# Patient Record
Sex: FEMALE | Race: WHITE | NOT HISPANIC OR LATINO | Employment: UNEMPLOYED | URBAN - METROPOLITAN AREA
[De-identification: names, ages, dates, MRNs, and addresses within clinical notes are randomized per-mention and may not be internally consistent; named-entity substitution may affect disease eponyms.]

---

## 2018-02-07 PROCEDURE — 99284 EMERGENCY DEPT VISIT MOD MDM: CPT | Mod: 25

## 2018-02-08 ENCOUNTER — HOSPITAL ENCOUNTER (EMERGENCY)
Facility: HOSPITAL | Age: 53
Discharge: HOME | End: 2018-02-08
Attending: EMERGENCY MEDICINE
Payer: MEDICAID

## 2018-02-08 VITALS
TEMPERATURE: 98 F | BODY MASS INDEX: 24.16 KG/M2 | RESPIRATION RATE: 18 BRPM | HEART RATE: 89 BPM | SYSTOLIC BLOOD PRESSURE: 117 MMHG | OXYGEN SATURATION: 97 % | HEIGHT: 57 IN | WEIGHT: 112 LBS | DIASTOLIC BLOOD PRESSURE: 66 MMHG

## 2018-02-08 DIAGNOSIS — F19.10 DRUG ABUSE: Primary | ICD-10-CM

## 2018-02-08 PROCEDURE — 96360 HYDRATION IV INFUSION INIT: CPT

## 2018-02-08 PROCEDURE — 25000003 PHARM REV CODE 250: Performed by: STUDENT IN AN ORGANIZED HEALTH CARE EDUCATION/TRAINING PROGRAM

## 2018-02-08 RX ORDER — FLUOXETINE HYDROCHLORIDE 20 MG/1
20 CAPSULE ORAL DAILY
Status: ON HOLD | COMMUNITY
End: 2023-01-04 | Stop reason: HOSPADM

## 2018-02-08 RX ADMIN — SODIUM CHLORIDE 1000 ML: 0.9 INJECTION, SOLUTION INTRAVENOUS at 02:02

## 2018-02-08 NOTE — ED NOTES
Pt identifiers Cristal Patricia and correct    LOC: The patient is awake, alert, aware of environment with an appropriate affect. Oriented x3, speaking appropriately  APPEARANCE: Pt resting comfortably, in no acute distress, pt is clean and well groomed, clothing properly fastened  SKIN: Skin warm, dry and intact, normal skin turgor, moist mucus membranes  RESPIRATORY: Airway is open and patent, respirations are spontaneous, even and unlabored, normal effort and rate  CARDIAC: Normal rate and rhythm, no peripheral edema noted, capillary refill < 3 seconds, bilateral radial pulses 2+  ABDOMEN: Soft, nontender, nondistended. Bowel sounds present   NEUROLOGIC: PERRL, facial expression is symmetrical, patient moving all extremities spontaneously, normal sensation in all extremities when touched with a finger.  Follows all commands appropriately. +sleepiness  MUSCULOSKELETAL: No obvious deformities.

## 2018-02-08 NOTE — ED NOTES
"Pt states she smoked a blunt around 2230 last night and thinks she had a seizure then threw up "I was tripping bad". She thinks it might have had MOJO in it. She states that the symptoms started subsided when she arrived to ER. She denies any symptoms at current and feels like she does not need to be here anymore.   "

## 2018-02-08 NOTE — ED PROVIDER NOTES
"Encounter Date: 2/7/2018       History     Chief Complaint   Patient presents with    Allergic Reaction     pt states she smoked "mojo" and became hyper allergic response x 2 hrs ago. pt ao x 2 presently. pt denies chest pain sob or nausea.      Ms. Garcia is a 53 yo F w/ hx of depression on prozac (has not been taking it for 2 weeks but denies depression currently), presenting to ED by EMS after experiencing bad trip, smoking "blunt that might have been mojo."  Patient states that around 10pm she hit a blunt that she is unsure if it was mojo, or laced, or what it's contents were. States that after that she started to have "a bad trip." Friend called EMS. Patient states that she was coming down by the time she arrived here. Denies chest pain, shortness of breath, dizziness, abd pain, nausea, endorses vomiting once. Denies fever, chills, cough. Denies head trauma, neck pain, head pain, focal neuro symptoms. Denies rash, oral or airway swelling, itching, trouble breathing or swallowing.           Review of patient's allergies indicates:  Allergies not on file  No past medical history on file.  No past surgical history on file.  No family history on file.  Social History   Substance Use Topics    Smoking status: Not on file    Smokeless tobacco: Not on file    Alcohol use Not on file     Review of Systems   Constitutional: Negative for appetite change, chills, fatigue and fever.   HENT: Negative for congestion and sore throat.    Eyes: Negative for photophobia and visual disturbance.   Respiratory: Negative for cough, choking, chest tightness, shortness of breath and stridor.    Cardiovascular: Negative for chest pain, palpitations and leg swelling.   Gastrointestinal: Positive for vomiting. Negative for abdominal distention, abdominal pain, diarrhea and nausea.   Genitourinary: Negative for flank pain and hematuria.   Musculoskeletal: Negative for back pain, neck pain and neck stiffness.   Skin: Negative for " pallor, rash and wound.   Neurological: Negative for dizziness, tremors, speech difficulty, weakness, light-headedness and numbness.   Psychiatric/Behavioral: Positive for confusion and hallucinations.       Physical Exam     Initial Vitals [02/07/18 2309]   BP Pulse Resp Temp SpO2   (!) 130/90 (!) 120 -- 98.2 °F (36.8 °C) 99 %      MAP       103.33         Physical Exam    Nursing note and vitals reviewed.  Constitutional: She appears well-developed and well-nourished. She is not diaphoretic. She is cooperative.  Non-toxic appearance. She does not have a sickly appearance. She does not appear ill. No distress.   HENT:   Head: Normocephalic and atraumatic.   Mouth/Throat: Oropharynx is clear and moist. No oropharyngeal exudate.   Eyes: EOM are normal. Pupils are equal, round, and reactive to light. No scleral icterus.   Neck: Normal range of motion.   Cardiovascular: Regular rhythm, normal heart sounds and intact distal pulses. Exam reveals no gallop and no friction rub.    No murmur heard.  Pulmonary/Chest: Breath sounds normal. No accessory muscle usage or stridor. No respiratory distress. She has no decreased breath sounds. She has no wheezes. She has no rhonchi. She has no rales.   Abdominal: Soft. She exhibits no distension. There is no tenderness. There is no rigidity, no rebound, no guarding, no CVA tenderness and negative Shankar's sign.   Musculoskeletal: Normal range of motion. She exhibits no edema or tenderness.   Neurological: She is alert and oriented to person, place, and time. She has normal strength. She displays no atrophy and no tremor. No cranial nerve deficit or sensory deficit. She exhibits normal muscle tone. She displays no seizure activity. Coordination normal. GCS eye subscore is 4. GCS verbal subscore is 5. GCS motor subscore is 6.   Skin: Skin is warm and dry. No erythema. No pallor.   Psychiatric: Her affect is not labile and not inappropriate. Her speech is not rapid and/or pressured and  "not slurred. She is not agitated and not actively hallucinating. Thought content is not delusional. She does not exhibit a depressed mood. She is attentive.         ED Course   Procedures  Labs Reviewed - No data to display       HO2  Ms. Garcia is a 51 yo F bib ems after "bad trip" and smoking possibly laced marijuana or mojo per patient, states that the episode was clearing before she arrived at the hospital, currently denies shortness of breath, chest pain, palpitations, head pain, head trauma, nausea, abd pain, back pain, confusion. Patient is awake alert and oriented, speaking and behaving appropriately, without delusions or hallucinations currently, patient denies HI/SI, states that she is feeling well.  Physical exam unremarkable.   Likely drug induced hallucinations, now observed for >3 hours with improvement in symptoms, no repeat symptoms, patient is alert and oriented, without coingestion. Not currently endorsing symptoms of allergic reaction. At this time believe that patient is stable for discharge.   Tunde Limon MD PGY2  02/08/2018 2:43 AM                         ED Course      Clinical Impression:   The encounter diagnosis was Drug abuse.                           Tunde Limon MD  Resident  02/08/18 0335    "

## 2022-08-01 ENCOUNTER — OFFICE VISIT (OUTPATIENT)
Dept: OPTOMETRY | Facility: CLINIC | Age: 57
End: 2022-08-01
Payer: MEDICAID

## 2022-08-01 DIAGNOSIS — H16.149 SUPERFICIAL PUNCTATE KERATITIS, UNSPECIFIED LATERALITY: Primary | ICD-10-CM

## 2022-08-01 PROCEDURE — 99999 PR PBB SHADOW E&M-NEW PATIENT-LVL II: CPT | Mod: PBBFAC,,, | Performed by: OPTOMETRIST

## 2022-08-01 PROCEDURE — 1159F MED LIST DOCD IN RCRD: CPT | Mod: CPTII,,, | Performed by: OPTOMETRIST

## 2022-08-01 PROCEDURE — 1160F PR REVIEW ALL MEDS BY PRESCRIBER/CLIN PHARMACIST DOCUMENTED: ICD-10-PCS | Mod: CPTII,,, | Performed by: OPTOMETRIST

## 2022-08-01 PROCEDURE — 99202 OFFICE O/P NEW SF 15 MIN: CPT | Mod: PBBFAC,PO | Performed by: OPTOMETRIST

## 2022-08-01 PROCEDURE — 1159F PR MEDICATION LIST DOCUMENTED IN MEDICAL RECORD: ICD-10-PCS | Mod: CPTII,,, | Performed by: OPTOMETRIST

## 2022-08-01 PROCEDURE — 92002 PR EYE EXAM, NEW PATIENT,INTERMED: ICD-10-PCS | Mod: S$PBB,,, | Performed by: OPTOMETRIST

## 2022-08-01 PROCEDURE — 92002 INTRM OPH EXAM NEW PATIENT: CPT | Mod: S$PBB,,, | Performed by: OPTOMETRIST

## 2022-08-01 PROCEDURE — 1160F RVW MEDS BY RX/DR IN RCRD: CPT | Mod: CPTII,,, | Performed by: OPTOMETRIST

## 2022-08-01 PROCEDURE — 99999 PR PBB SHADOW E&M-NEW PATIENT-LVL II: ICD-10-PCS | Mod: PBBFAC,,, | Performed by: OPTOMETRIST

## 2022-08-01 RX ORDER — DEXTROAMPHETAMINE SACCHARATE, AMPHETAMINE ASPARTATE MONOHYDRATE, DEXTROAMPHETAMINE SULFATE AND AMPHETAMINE SULFATE 7.5; 7.5; 7.5; 7.5 MG/1; MG/1; MG/1; MG/1
30 CAPSULE, EXTENDED RELEASE ORAL EVERY MORNING
Status: ON HOLD | COMMUNITY
End: 2023-01-04 | Stop reason: HOSPADM

## 2022-08-01 NOTE — PROGRESS NOTES
Subjective:       Patient ID: Cristal Garcia is a 56 y.o. female      Chief Complaint   Patient presents with    Eye Problem     History of Present Illness  Dls: ?     57 y/o female presents today with c/o x 1 week was working in the yard and had lots of bugs all over her body and had to scratch bugs off eyes. Pt c/o fbs ou pain ou 1-2 had white mucous ou x 1 week ago no tearing or mucous now no changes in vision.  Pt was using otc dry eye gtts ou not using any gtts now. Last used drops x 4 days ago.         Assessment/Plan:     1. Superficial punctate keratitis, unspecified laterality  No FB on exam. Mild SPK. Start AT QID OU. Per pt was given steroid cream to use for bug bites on body - caution with usage around periorbital area.     Follow up if symptoms worsen or fail to improve.

## 2022-12-23 ENCOUNTER — HOSPITAL ENCOUNTER (EMERGENCY)
Facility: HOSPITAL | Age: 57
Discharge: PSYCHIATRIC OR SUBSTANCE USE FACILITY | End: 2022-12-24
Attending: EMERGENCY MEDICINE
Payer: MEDICAID

## 2022-12-23 DIAGNOSIS — Z00.8 MEDICAL CLEARANCE FOR PSYCHIATRIC ADMISSION: ICD-10-CM

## 2022-12-23 DIAGNOSIS — F23 ACUTE PSYCHOSIS: Primary | ICD-10-CM

## 2022-12-23 DIAGNOSIS — N63.20 MASS OF LEFT BREAST, UNSPECIFIED QUADRANT: ICD-10-CM

## 2022-12-23 DIAGNOSIS — F10.929 ALCOHOLIC INTOXICATION WITH COMPLICATION: ICD-10-CM

## 2022-12-23 LAB
ALBUMIN SERPL BCP-MCNC: 4.7 G/DL (ref 3.5–5.2)
ALP SERPL-CCNC: 111 U/L (ref 55–135)
ALT SERPL W/O P-5'-P-CCNC: 44 U/L (ref 10–44)
AMPHET+METHAMPHET UR QL: NEGATIVE
ANION GAP SERPL CALC-SCNC: 12 MMOL/L (ref 8–16)
APAP SERPL-MCNC: <3 UG/ML (ref 10–20)
AST SERPL-CCNC: 41 U/L (ref 10–40)
BARBITURATES UR QL SCN>200 NG/ML: NEGATIVE
BASOPHILS # BLD AUTO: 0.06 K/UL (ref 0–0.2)
BASOPHILS NFR BLD: 0.6 % (ref 0–1.9)
BENZODIAZ UR QL SCN>200 NG/ML: NEGATIVE
BILIRUB SERPL-MCNC: 0.2 MG/DL (ref 0.1–1)
BILIRUB UR QL STRIP: NEGATIVE
BUN SERPL-MCNC: 16 MG/DL (ref 6–20)
BZE UR QL SCN: NEGATIVE
CALCIUM SERPL-MCNC: 9.9 MG/DL (ref 8.7–10.5)
CANNABINOIDS UR QL SCN: NEGATIVE
CHLORIDE SERPL-SCNC: 107 MMOL/L (ref 95–110)
CLARITY UR REFRACT.AUTO: CLEAR
CO2 SERPL-SCNC: 23 MMOL/L (ref 23–29)
COLOR UR AUTO: YELLOW
CREAT SERPL-MCNC: 0.7 MG/DL (ref 0.5–1.4)
CREAT UR-MCNC: 82 MG/DL (ref 15–325)
DIFFERENTIAL METHOD: ABNORMAL
EOSINOPHIL # BLD AUTO: 0.1 K/UL (ref 0–0.5)
EOSINOPHIL NFR BLD: 0.7 % (ref 0–8)
ERYTHROCYTE [DISTWIDTH] IN BLOOD BY AUTOMATED COUNT: 14.6 % (ref 11.5–14.5)
EST. GFR  (NO RACE VARIABLE): >60 ML/MIN/1.73 M^2
ETHANOL SERPL-MCNC: 269 MG/DL
GLUCOSE SERPL-MCNC: 96 MG/DL (ref 70–110)
GLUCOSE UR QL STRIP: NEGATIVE
HCT VFR BLD AUTO: 42.7 % (ref 37–48.5)
HGB BLD-MCNC: 14.7 G/DL (ref 12–16)
HGB UR QL STRIP: NEGATIVE
IMM GRANULOCYTES # BLD AUTO: 0.07 K/UL (ref 0–0.04)
IMM GRANULOCYTES NFR BLD AUTO: 0.7 % (ref 0–0.5)
KETONES UR QL STRIP: NEGATIVE
LEUKOCYTE ESTERASE UR QL STRIP: NEGATIVE
LYMPHOCYTES # BLD AUTO: 3.7 K/UL (ref 1–4.8)
LYMPHOCYTES NFR BLD: 38.2 % (ref 18–48)
MCH RBC QN AUTO: 34.1 PG (ref 27–31)
MCHC RBC AUTO-ENTMCNC: 34.4 G/DL (ref 32–36)
MCV RBC AUTO: 99 FL (ref 82–98)
METHADONE UR QL SCN>300 NG/ML: NEGATIVE
MONOCYTES # BLD AUTO: 0.6 K/UL (ref 0.3–1)
MONOCYTES NFR BLD: 5.7 % (ref 4–15)
NEUTROPHILS # BLD AUTO: 5.2 K/UL (ref 1.8–7.7)
NEUTROPHILS NFR BLD: 54.1 % (ref 38–73)
NITRITE UR QL STRIP: NEGATIVE
NRBC BLD-RTO: 0 /100 WBC
OPIATES UR QL SCN: NEGATIVE
PCP UR QL SCN>25 NG/ML: NEGATIVE
PH UR STRIP: 5 [PH] (ref 5–8)
PLATELET # BLD AUTO: 486 K/UL (ref 150–450)
PMV BLD AUTO: 9 FL (ref 9.2–12.9)
POTASSIUM SERPL-SCNC: 5 MMOL/L (ref 3.5–5.1)
PROT SERPL-MCNC: 8.8 G/DL (ref 6–8.4)
PROT UR QL STRIP: NEGATIVE
RBC # BLD AUTO: 4.31 M/UL (ref 4–5.4)
SALICYLATES SERPL-MCNC: <5 MG/DL (ref 15–30)
SARS-COV-2 RDRP RESP QL NAA+PROBE: NEGATIVE
SODIUM SERPL-SCNC: 142 MMOL/L (ref 136–145)
SP GR UR STRIP: 1.02 (ref 1–1.03)
TOXICOLOGY INFORMATION: NORMAL
TSH SERPL DL<=0.005 MIU/L-ACNC: 0.45 UIU/ML (ref 0.4–4)
URN SPEC COLLECT METH UR: NORMAL
WBC # BLD AUTO: 9.64 K/UL (ref 3.9–12.7)

## 2022-12-23 PROCEDURE — 99285 EMERGENCY DEPT VISIT HI MDM: CPT | Mod: 25

## 2022-12-23 PROCEDURE — 96361 HYDRATE IV INFUSION ADD-ON: CPT

## 2022-12-23 PROCEDURE — 93010 ELECTROCARDIOGRAM REPORT: CPT | Mod: ,,, | Performed by: INTERNAL MEDICINE

## 2022-12-23 PROCEDURE — 81003 URINALYSIS AUTO W/O SCOPE: CPT | Performed by: EMERGENCY MEDICINE

## 2022-12-23 PROCEDURE — 99285 PR EMERGENCY DEPT VISIT,LEVEL V: ICD-10-PCS | Mod: CS,,, | Performed by: EMERGENCY MEDICINE

## 2022-12-23 PROCEDURE — 84443 ASSAY THYROID STIM HORMONE: CPT | Performed by: EMERGENCY MEDICINE

## 2022-12-23 PROCEDURE — 25000003 PHARM REV CODE 250: Performed by: EMERGENCY MEDICINE

## 2022-12-23 PROCEDURE — 63600175 PHARM REV CODE 636 W HCPCS: Performed by: EMERGENCY MEDICINE

## 2022-12-23 PROCEDURE — 80179 DRUG ASSAY SALICYLATE: CPT | Performed by: EMERGENCY MEDICINE

## 2022-12-23 PROCEDURE — U0002 COVID-19 LAB TEST NON-CDC: HCPCS | Performed by: EMERGENCY MEDICINE

## 2022-12-23 PROCEDURE — 85025 COMPLETE CBC W/AUTO DIFF WBC: CPT | Performed by: EMERGENCY MEDICINE

## 2022-12-23 PROCEDURE — 80053 COMPREHEN METABOLIC PANEL: CPT | Performed by: EMERGENCY MEDICINE

## 2022-12-23 PROCEDURE — 82077 ASSAY SPEC XCP UR&BREATH IA: CPT | Mod: 91 | Performed by: EMERGENCY MEDICINE

## 2022-12-23 PROCEDURE — 80143 DRUG ASSAY ACETAMINOPHEN: CPT | Performed by: EMERGENCY MEDICINE

## 2022-12-23 PROCEDURE — 99285 EMERGENCY DEPT VISIT HI MDM: CPT | Mod: CS,,, | Performed by: EMERGENCY MEDICINE

## 2022-12-23 PROCEDURE — 96372 THER/PROPH/DIAG INJ SC/IM: CPT | Performed by: EMERGENCY MEDICINE

## 2022-12-23 PROCEDURE — 93010 EKG 12-LEAD: ICD-10-PCS | Mod: ,,, | Performed by: INTERNAL MEDICINE

## 2022-12-23 PROCEDURE — 93005 ELECTROCARDIOGRAM TRACING: CPT

## 2022-12-23 PROCEDURE — 80307 DRUG TEST PRSMV CHEM ANLYZR: CPT | Performed by: EMERGENCY MEDICINE

## 2022-12-23 PROCEDURE — 96374 THER/PROPH/DIAG INJ IV PUSH: CPT

## 2022-12-23 RX ORDER — HALOPERIDOL 5 MG/ML
5 INJECTION INTRAMUSCULAR
Status: COMPLETED | OUTPATIENT
Start: 2022-12-23 | End: 2022-12-23

## 2022-12-23 RX ORDER — DIPHENHYDRAMINE HYDROCHLORIDE 50 MG/ML
50 INJECTION INTRAMUSCULAR; INTRAVENOUS
Status: ACTIVE | OUTPATIENT
Start: 2022-12-23 | End: 2022-12-24

## 2022-12-23 RX ORDER — LORAZEPAM 2 MG/ML
1 INJECTION INTRAMUSCULAR
Status: ACTIVE | OUTPATIENT
Start: 2022-12-23 | End: 2022-12-24

## 2022-12-23 RX ORDER — LORAZEPAM 2 MG/ML
1 INJECTION INTRAMUSCULAR
Status: COMPLETED | OUTPATIENT
Start: 2022-12-23 | End: 2022-12-23

## 2022-12-23 RX ORDER — THIAMINE HCL 100 MG
100 TABLET ORAL DAILY
Status: DISCONTINUED | OUTPATIENT
Start: 2022-12-23 | End: 2022-12-24 | Stop reason: HOSPADM

## 2022-12-23 RX ORDER — LORAZEPAM 2 MG/ML
1 INJECTION INTRAMUSCULAR
Status: DISCONTINUED | OUTPATIENT
Start: 2022-12-23 | End: 2022-12-23

## 2022-12-23 RX ORDER — DIPHENHYDRAMINE HYDROCHLORIDE 50 MG/ML
50 INJECTION INTRAMUSCULAR; INTRAVENOUS
Status: DISCONTINUED | OUTPATIENT
Start: 2022-12-23 | End: 2022-12-23

## 2022-12-23 RX ORDER — B-COMPLEX WITH VITAMIN C
1 TABLET ORAL DAILY
Status: DISCONTINUED | OUTPATIENT
Start: 2022-12-23 | End: 2022-12-24 | Stop reason: HOSPADM

## 2022-12-23 RX ORDER — FOLIC ACID 1 MG/1
1 TABLET ORAL
Status: COMPLETED | OUTPATIENT
Start: 2022-12-23 | End: 2022-12-23

## 2022-12-23 RX ADMIN — LORAZEPAM 1 MG: 2 INJECTION INTRAMUSCULAR; INTRAVENOUS at 01:12

## 2022-12-23 RX ADMIN — Medication 100 MG: at 06:12

## 2022-12-23 RX ADMIN — HALOPERIDOL LACTATE 5 MG: 5 INJECTION, SOLUTION INTRAMUSCULAR at 03:12

## 2022-12-23 RX ADMIN — FOLIC ACID 1 MG: 1 TABLET ORAL at 06:12

## 2022-12-23 RX ADMIN — SODIUM CHLORIDE, SODIUM LACTATE, POTASSIUM CHLORIDE, AND CALCIUM CHLORIDE 1000 ML: .6; .31; .03; .02 INJECTION, SOLUTION INTRAVENOUS at 12:12
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
## 2022-12-23 NOTE — ED TRIAGE NOTES
Cristal Garcia, a 57 y.o. female presents to the ED w/ complaint of Psych eval    Triage note: Patient brought in in police custody after a report from family of patients irrational behavior. Stated patient has been drinking and is bipolar and off of medications. Patient also states that she has been off her medications since October and has been drinking. Patient is extremely excitable, anxious agitated at this time. Patient is unable to follow simple commands and continues to pace.  Chief Complaint   Patient presents with    Psychiatric Evaluation     Brought in by [facility]. Family reports pt drinking a lot and off meds. Hx of schizophrenia.     Review of patient's allergies indicates:  No Known Allergies  Past Medical History:   Diagnosis Date    Depression

## 2022-12-23 NOTE — ED NOTES
Unable to start IV, or additional meds due to patients aggitation. Provider Notified order for IM Ativan obtained.

## 2022-12-24 VITALS
SYSTOLIC BLOOD PRESSURE: 110 MMHG | DIASTOLIC BLOOD PRESSURE: 75 MMHG | HEIGHT: 57 IN | BODY MASS INDEX: 24.81 KG/M2 | RESPIRATION RATE: 16 BRPM | OXYGEN SATURATION: 95 % | TEMPERATURE: 98 F | HEART RATE: 87 BPM | WEIGHT: 115 LBS

## 2022-12-24 PROBLEM — R03.0 ELEVATED BP WITHOUT DIAGNOSIS OF HYPERTENSION: Status: ACTIVE | Noted: 2022-12-24

## 2022-12-24 PROBLEM — F10.10 ALCOHOL ABUSE: Status: ACTIVE | Noted: 2022-12-24

## 2022-12-24 LAB — ETHANOL SERPL-MCNC: 26 MG/DL

## 2022-12-24 RX ORDER — OLANZAPINE 5 MG/1
10 TABLET, ORALLY DISINTEGRATING ORAL EVERY 8 HOURS PRN
Status: DISCONTINUED | OUTPATIENT
Start: 2022-12-24 | End: 2022-12-24 | Stop reason: HOSPADM

## 2022-12-24 NOTE — ED NOTES
Patient's father, Mr. Alfa Garcia was contacted @ [phone] to inform of patient's pending transfer to [facility].

## 2022-12-24 NOTE — ED NOTES
Viraj at bedside for pt transfer; original PEC, face sheet, personal belongings given to [facility] personal. Pt is calm and cooperative. Security notified and at bedside. Pt calm and cooperative.

## 2023-01-04 ENCOUNTER — PATIENT OUTREACH (OUTPATIENT)
Dept: ADMINISTRATIVE | Facility: OTHER | Age: 58
End: 2023-01-04
Payer: MEDICAID

## 2023-01-09 NOTE — PROGRESS NOTES
CHW - Initial Contact    This Community Health Worker completed the Social Determinant of Health questionnaire with patient via telephone today.    Pt identified barriers of most importance are: finances and outpatient rehab  Referrals to community agencies completed with patient/caregiver consent outside of [facility] include: Behavioral Health Services, Power to Care([facility]), [facility]  Referrals were put through [facility] - No  Other information discussed the patient needs / wants help with: We agreed to follow up in a week after patient has had a chance to call these resources.  Follow-up Outreach - Due: 1/18/2023

## 2023-01-11 ENCOUNTER — PATIENT OUTREACH (OUTPATIENT)
Dept: ADMINISTRATIVE | Facility: OTHER | Age: 58
End: 2023-01-11
Payer: MEDICAID

## 2023-01-11 NOTE — PROGRESS NOTES
CHW - Follow Up and Case Closure    This Community Health Worker completed a follow up visit with caregiver via telephone today.  Caregiver reported: has numbers of resources to call.   Caregiver denied any additional needs at this time and agrees with episode closure at this time.  Provided caregiver with Community Health Worker's contact information and encouraged him to contact this Community Health Worker if additional needs arise.

## 2023-04-10 ENCOUNTER — HOSPITAL ENCOUNTER (EMERGENCY)
Facility: HOSPITAL | Age: 58
Discharge: PSYCHIATRIC OR SUBSTANCE USE FACILITY | End: 2023-04-11
Attending: EMERGENCY MEDICINE
Payer: MEDICAID

## 2023-04-10 DIAGNOSIS — Z00.8 MEDICAL CLEARANCE FOR PSYCHIATRIC ADMISSION: ICD-10-CM

## 2023-04-10 DIAGNOSIS — R00.0 TACHYCARDIA: ICD-10-CM

## 2023-04-10 DIAGNOSIS — F23 ACUTE PSYCHOSIS: Primary | ICD-10-CM

## 2023-04-10 LAB
ALBUMIN SERPL BCP-MCNC: 4.4 G/DL (ref 3.5–5.2)
ALP SERPL-CCNC: 99 U/L (ref 55–135)
ALT SERPL W/O P-5'-P-CCNC: 59 U/L (ref 10–44)
ANION GAP SERPL CALC-SCNC: 15 MMOL/L (ref 8–16)
APAP SERPL-MCNC: <3 UG/ML (ref 10–20)
AST SERPL-CCNC: 54 U/L (ref 10–40)
BASOPHILS # BLD AUTO: 0.04 K/UL (ref 0–0.2)
BASOPHILS NFR BLD: 0.4 % (ref 0–1.9)
BILIRUB SERPL-MCNC: 0.3 MG/DL (ref 0.1–1)
BUN SERPL-MCNC: 19 MG/DL (ref 6–20)
CALCIUM SERPL-MCNC: 9.5 MG/DL (ref 8.7–10.5)
CHLORIDE SERPL-SCNC: 107 MMOL/L (ref 95–110)
CO2 SERPL-SCNC: 17 MMOL/L (ref 23–29)
CREAT SERPL-MCNC: 0.8 MG/DL (ref 0.5–1.4)
DIFFERENTIAL METHOD: ABNORMAL
EOSINOPHIL # BLD AUTO: 0.1 K/UL (ref 0–0.5)
EOSINOPHIL NFR BLD: 0.5 % (ref 0–8)
ERYTHROCYTE [DISTWIDTH] IN BLOOD BY AUTOMATED COUNT: 13.5 % (ref 11.5–14.5)
EST. GFR  (NO RACE VARIABLE): >60 ML/MIN/1.73 M^2
ETHANOL SERPL-MCNC: 260 MG/DL
GLUCOSE SERPL-MCNC: 120 MG/DL (ref 70–110)
HCT VFR BLD AUTO: 41.5 % (ref 37–48.5)
HCV AB SERPL QL IA: NORMAL
HGB BLD-MCNC: 13.8 G/DL (ref 12–16)
HIV 1+2 AB+HIV1 P24 AG SERPL QL IA: NORMAL
IMM GRANULOCYTES # BLD AUTO: 0.03 K/UL (ref 0–0.04)
IMM GRANULOCYTES NFR BLD AUTO: 0.3 % (ref 0–0.5)
LYMPHOCYTES # BLD AUTO: 3.4 K/UL (ref 1–4.8)
LYMPHOCYTES NFR BLD: 36.6 % (ref 18–48)
MCH RBC QN AUTO: 31.5 PG (ref 27–31)
MCHC RBC AUTO-ENTMCNC: 33.3 G/DL (ref 32–36)
MCV RBC AUTO: 95 FL (ref 82–98)
MONOCYTES # BLD AUTO: 0.4 K/UL (ref 0.3–1)
MONOCYTES NFR BLD: 4.7 % (ref 4–15)
NEUTROPHILS # BLD AUTO: 5.3 K/UL (ref 1.8–7.7)
NEUTROPHILS NFR BLD: 57.5 % (ref 38–73)
NRBC BLD-RTO: 0 /100 WBC
PLATELET # BLD AUTO: 358 K/UL (ref 150–450)
PMV BLD AUTO: 9.5 FL (ref 9.2–12.9)
POTASSIUM SERPL-SCNC: 4.7 MMOL/L (ref 3.5–5.1)
PROT SERPL-MCNC: 8.7 G/DL (ref 6–8.4)
RBC # BLD AUTO: 4.38 M/UL (ref 4–5.4)
SALICYLATES SERPL-MCNC: <5 MG/DL (ref 15–30)
SODIUM SERPL-SCNC: 139 MMOL/L (ref 136–145)
TSH SERPL DL<=0.005 MIU/L-ACNC: 1.91 UIU/ML (ref 0.4–4)
WBC # BLD AUTO: 9.3 K/UL (ref 3.9–12.7)

## 2023-04-10 PROCEDURE — 93005 ELECTROCARDIOGRAM TRACING: CPT

## 2023-04-10 PROCEDURE — 82077 ASSAY SPEC XCP UR&BREATH IA: CPT | Performed by: EMERGENCY MEDICINE

## 2023-04-10 PROCEDURE — 80053 COMPREHEN METABOLIC PANEL: CPT | Performed by: EMERGENCY MEDICINE

## 2023-04-10 PROCEDURE — 93010 EKG 12-LEAD: ICD-10-PCS | Mod: ,,, | Performed by: INTERNAL MEDICINE

## 2023-04-10 PROCEDURE — 25000003 PHARM REV CODE 250: Performed by: EMERGENCY MEDICINE

## 2023-04-10 PROCEDURE — 96372 THER/PROPH/DIAG INJ SC/IM: CPT | Performed by: EMERGENCY MEDICINE

## 2023-04-10 PROCEDURE — 80179 DRUG ASSAY SALICYLATE: CPT | Performed by: EMERGENCY MEDICINE

## 2023-04-10 PROCEDURE — 99285 EMERGENCY DEPT VISIT HI MDM: CPT | Mod: ,,, | Performed by: EMERGENCY MEDICINE

## 2023-04-10 PROCEDURE — 85025 COMPLETE CBC W/AUTO DIFF WBC: CPT | Performed by: EMERGENCY MEDICINE

## 2023-04-10 PROCEDURE — 84443 ASSAY THYROID STIM HORMONE: CPT | Performed by: EMERGENCY MEDICINE

## 2023-04-10 PROCEDURE — 99285 PR EMERGENCY DEPT VISIT,LEVEL V: ICD-10-PCS | Mod: ,,, | Performed by: EMERGENCY MEDICINE

## 2023-04-10 PROCEDURE — 86803 HEPATITIS C AB TEST: CPT | Performed by: PHYSICIAN ASSISTANT

## 2023-04-10 PROCEDURE — 63600175 PHARM REV CODE 636 W HCPCS: Performed by: EMERGENCY MEDICINE

## 2023-04-10 PROCEDURE — 96361 HYDRATE IV INFUSION ADD-ON: CPT

## 2023-04-10 PROCEDURE — 87389 HIV-1 AG W/HIV-1&-2 AB AG IA: CPT | Performed by: PHYSICIAN ASSISTANT

## 2023-04-10 PROCEDURE — 96360 HYDRATION IV INFUSION INIT: CPT

## 2023-04-10 PROCEDURE — 93010 ELECTROCARDIOGRAM REPORT: CPT | Mod: ,,, | Performed by: INTERNAL MEDICINE

## 2023-04-10 PROCEDURE — 80143 DRUG ASSAY ACETAMINOPHEN: CPT | Performed by: EMERGENCY MEDICINE

## 2023-04-10 RX ORDER — HALOPERIDOL 5 MG/ML
2.5 INJECTION INTRAMUSCULAR EVERY 4 HOURS PRN
Status: DISCONTINUED | OUTPATIENT
Start: 2023-04-10 | End: 2023-04-11 | Stop reason: HOSPADM

## 2023-04-10 RX ORDER — DROPERIDOL 2.5 MG/ML
10 INJECTION, SOLUTION INTRAMUSCULAR; INTRAVENOUS ONCE
Status: COMPLETED | OUTPATIENT
Start: 2023-04-10 | End: 2023-04-10

## 2023-04-10 RX ADMIN — DROPERIDOL 10 MG: 2.5 INJECTION, SOLUTION INTRAMUSCULAR; INTRAVENOUS at 07:04

## 2023-04-10 RX ADMIN — SODIUM CHLORIDE 1000 ML: 0.9 INJECTION, SOLUTION INTRAVENOUS at 07:04

## 2023-04-10 RX ADMIN — DEXTROSE AND SODIUM CHLORIDE 1000 ML: 5; .9 INJECTION, SOLUTION INTRAVENOUS at 10:04

## 2023-04-11 VITALS
SYSTOLIC BLOOD PRESSURE: 115 MMHG | HEART RATE: 71 BPM | BODY MASS INDEX: 24.81 KG/M2 | RESPIRATION RATE: 16 BRPM | HEIGHT: 57 IN | TEMPERATURE: 98 F | OXYGEN SATURATION: 98 % | WEIGHT: 115 LBS | DIASTOLIC BLOOD PRESSURE: 65 MMHG

## 2023-04-11 LAB
AMPHET+METHAMPHET UR QL: NEGATIVE
B-HCG UR QL: NEGATIVE
BARBITURATES UR QL SCN>200 NG/ML: NEGATIVE
BENZODIAZ UR QL SCN>200 NG/ML: NEGATIVE
BILIRUB UR QL STRIP: NEGATIVE
BZE UR QL SCN: NEGATIVE
CANNABINOIDS UR QL SCN: ABNORMAL
CLARITY UR REFRACT.AUTO: CLEAR
COLOR UR AUTO: YELLOW
CREAT UR-MCNC: 73 MG/DL (ref 15–325)
CTP QC/QA: YES
ETHANOL SERPL-MCNC: 82 MG/DL
GLUCOSE UR QL STRIP: ABNORMAL
HGB UR QL STRIP: NEGATIVE
HYALINE CASTS UR QL AUTO: 3 /LPF
KETONES UR QL STRIP: NEGATIVE
LEUKOCYTE ESTERASE UR QL STRIP: ABNORMAL
METHADONE UR QL SCN>300 NG/ML: NEGATIVE
MICROSCOPIC COMMENT: ABNORMAL
NITRITE UR QL STRIP: NEGATIVE
OPIATES UR QL SCN: NEGATIVE
PCP UR QL SCN>25 NG/ML: NEGATIVE
PH UR STRIP: 6 [PH] (ref 5–8)
PROT UR QL STRIP: NEGATIVE
RBC #/AREA URNS AUTO: 2 /HPF (ref 0–4)
SP GR UR STRIP: 1.01 (ref 1–1.03)
SQUAMOUS #/AREA URNS AUTO: 1 /HPF
TOXICOLOGY INFORMATION: ABNORMAL
URN SPEC COLLECT METH UR: ABNORMAL
WBC #/AREA URNS AUTO: 2 /HPF (ref 0–5)
YEAST UR QL AUTO: ABNORMAL

## 2023-04-11 PROCEDURE — 99285 EMERGENCY DEPT VISIT HI MDM: CPT

## 2023-04-11 PROCEDURE — 82077 ASSAY SPEC XCP UR&BREATH IA: CPT | Performed by: STUDENT IN AN ORGANIZED HEALTH CARE EDUCATION/TRAINING PROGRAM

## 2023-04-11 PROCEDURE — 81001 URINALYSIS AUTO W/SCOPE: CPT | Mod: 59 | Performed by: EMERGENCY MEDICINE

## 2023-04-11 PROCEDURE — 80307 DRUG TEST PRSMV CHEM ANLYZR: CPT | Performed by: EMERGENCY MEDICINE

## 2023-04-11 PROCEDURE — 81025 URINE PREGNANCY TEST: CPT | Performed by: STUDENT IN AN ORGANIZED HEALTH CARE EDUCATION/TRAINING PROGRAM

## 2023-04-11 PROCEDURE — 96361 HYDRATE IV INFUSION ADD-ON: CPT

## 2023-04-11 NOTE — PROVIDER PROGRESS NOTES - EMERGENCY DEPT.
"Encounter Date: 4/10/2023    ED Physician Progress Notes          ED Resident HAND-OFF NOTE:  10:16 PM 4/11/2023  Cristal Garcia is a 57 y.o. female who presented to the ED on 4/10/2023 and has been managed by Dr. Corral, who reports patient C/O OPC. I assumed care of patient from off-going ED physician team at 10:16 PM pending medical clearance.      On my exam, I appreciate:  /76 (BP Location: Right arm, Patient Position: Lying)   Pulse 80   Temp 98.3 °F (36.8 °C) (Oral)   Resp 20   Ht 4' 9" (1.448 m)   Wt 52.2 kg (115 lb)   SpO2 99%   BMI 24.89 kg/m²         Disposition: I anticipate patient will be transferred to a psychiatric facility.   I have discussed and counseled Cristal Garcia regarding exam, results, diagnosis, treatment, and plan.  ______________________  Geo Benitez MD   Emergency Medicine Resident  10:16 PM 4/10/2023      UPDATE:   CBC unremarkable without leukocytosis, significant anemia, or decreased platelets. Normal TSH.  Negative salicylate and acetaminophen levels.  CMP shows mildly low bicarb at 17, which we suspect to be secondary to alcoholic ketoacidosis.    Update 4:21 AM  Patient's repeat ethanol is 80.  Patient remains hemodynamically and clinically stable.  She will be medically cleared at this time and transferred to psychiatric facility upon placement.      Clinical Impression  :  Tachycardia  Medical clearance for psychiatric admission  Acute psychosis (Primary)    "

## 2023-04-11 NOTE — ED NOTES
Pt escorted off of the unit on a stretcher by ED and security staff. Pt's PEC, transfer form, and all belongings given to [facility] staff. Pt remained calm and cooperative for the transfer.

## 2023-04-11 NOTE — ED NOTES
Pt dressed in paper scrubs per hospital policy. All harmful objects removed from the room. Pt belongings removed, documented and placed in locked closet. Sitter at bedside maintaining direct 1-1 observation.

## 2023-04-11 NOTE — ED PROVIDER NOTES
"Encounter Date: 4/10/2023       History     Chief Complaint   Patient presents with    Mental Health Problem     OPC; decompensated schizo +ETOH     HPI patient is a 57-year-old female with a history of bipolar affective disorder, schizophrenia per family who was brought in by 's office with OPC in place for agitation and grave disability.  Per documentation, the patient was noted to be significantly agitated, aggressive towards family member, father who lives with the patient.  Patient arrives with OPC in place with 's agitated, screaming "tell these mother fuckers to stop laughing at me"!  Patient is unwilling to answer any questions but when asked what brought her to the emergency department she yells "because I do drugs", and does not provide any additional details.     Review of patient's allergies indicates:  No Known Allergies  Past Medical History:   Diagnosis Date    Bipolar disorder, unspecified     Depression      No past surgical history on file.  Family History   Problem Relation Age of Onset    No Known Problems Mother     No Known Problems Father     No Known Problems Sister     No Known Problems Brother     No Known Problems Maternal Aunt     No Known Problems Maternal Uncle     No Known Problems Paternal Aunt     No Known Problems Paternal Uncle     No Known Problems Maternal Grandmother     No Known Problems Maternal Grandfather     No Known Problems Paternal Grandmother     No Known Problems Paternal Grandfather      Social History     Tobacco Use    Smoking status: Some Days     Types: Cigarettes    Smokeless tobacco: Never    Tobacco comments:     Patient stated that she smokes 1 pack a week.    Substance Use Topics    Alcohol use: Yes     Comment: Patient stated that she drinks alot    Drug use: Yes     Types: Marijuana     Review of Systems  10 point review of systems reviewed with patient otherwise negative.     Physical Exam     Initial Vitals [04/10/23 1922]   BP Pulse Resp " Temp SpO2   (!) 144/88 (!) 160 20 98 °F (36.7 °C) 98 %      MAP       --         Physical Exam  Physical Exam     Nursing note and vitals reviewed.  Constitutional: Patient appears well-developed and well-nourished. No distress.   HENT:   Head: Normocephalic and atraumatic.  Mouth: Edentulous    Eyes: Conjunctivae and EOM are normal. Pupils are equal, round, and reactive to light.   Neck: Neck supple.   Normal range of motion.  Cardiovascular: Normal rate, regular rhythm, normal heart sounds and intact distal pulses.   Pulmonary/Chest: Breath sounds normal.   Abdominal: Abdomen is soft. Patient exhibits no distension. There is no abdominal tenderness.   Musculoskeletal:      Cervical back: Normal range of motion and neck supple.   Neurological: Patient is alert and oriented to person, place, and time. No cranial nerve deficit. Gait normal. GCS score is 15.    Skin: Skin is warm and dry.  Psych: Patient screaming, agitated, not redirectable on arrival    ED Course   Procedures  Labs Reviewed   COMPREHENSIVE METABOLIC PANEL - Abnormal; Notable for the following components:       Result Value    CO2 17 (*)     Glucose 120 (*)     Total Protein 8.7 (*)     AST 54 (*)     ALT 59 (*)     All other components within normal limits    Narrative:     Release to patient->Immediate   HIV 1 / 2 ANTIBODY   HEPATITIS C ANTIBODY   TSH   URINALYSIS, REFLEX TO URINE CULTURE   DRUG SCREEN PANEL, URINE EMERGENCY   ALCOHOL,MEDICAL (ETHANOL)   ACETAMINOPHEN LEVEL   SALICYLATE LEVEL   CBC W/ AUTO DIFFERENTIAL          Imaging Results    None          Medications   sodium chloride 0.9% bolus 1,000 mL 1,000 mL (has no administration in time range)   haloperidol lactate injection 2.5 mg (has no administration in time range)   droPERidol injection 10 mg (10 mg Intramuscular Given 4/10/23 1930)                       Patient arrives to the emergency department screaming, agitated, aggressive toward staff and not redirectable and thus received  "10 mg intramuscular droperidol for the safety of the patient and staff.  After receiving droperidol, the patient is much more calm and states that she is in the emergency department because she she does drugs" but denies any other complaints, no pain, short shortness of breath, abdominal pain, injuries.  Patient was placed on a PEC immediately upon arrival given patient with grave disability and decompensated mental illness.    I have personally reviewed and interpreted the patient's EKG:  Sinus tachycardia at 108bpm., underlying motion artifact, QTc 482 without evidence of ischemia  I have personally reviewed and interpreted the patients laboratory studies.  AST 54, ALT 59, glucose 120, CO2 17    At the time of sign-out, patient is pending laboratory studies, repeat chemistry following IV fluids, re-evaluation for medical clearance for transfer to psychiatric facility.          Clinical Impression:   Final diagnoses:  [R00.0] Tachycardia               Nga Chong MD  04/10/23 2148    "

## 2023-04-11 NOTE — ED NOTES
Pt Belongings:     Closet:   1 orange and white romper  1 pair of white socks  1 pair of black shoes  1 pair of underwear

## 2023-04-11 NOTE — ED TRIAGE NOTES
Pt arrives via OPC, combative, verbally aggressive and uncooperative. Pt had to be restrained by security and staff prior to pt being roomed. Pt poor historian at this time. Hx schizophrenia.

## 2023-04-11 NOTE — ED NOTES
Assumed pt care, she is lying on the stretcher resting NAD noted. Pt is calm and cooperative. Pt informed of her pending transfer to [facility]. She is dressed in paper scrubs all belongings secured. Pt denies pain or discomfort. RN at the bedside, DVC maintained.

## 2023-04-12 PROBLEM — R79.89 ELEVATED LFTS: Status: ACTIVE | Noted: 2023-04-12

## 2023-04-12 PROBLEM — R19.7 DIARRHEA: Status: ACTIVE | Noted: 2023-04-12

## 2023-05-01 ENCOUNTER — HOSPITAL ENCOUNTER (EMERGENCY)
Facility: OTHER | Age: 58
Discharge: HOME | End: 2023-05-01
Attending: EMERGENCY MEDICINE
Payer: MEDICAID

## 2023-05-01 VITALS
WEIGHT: 125 LBS | RESPIRATION RATE: 17 BRPM | BODY MASS INDEX: 28.12 KG/M2 | SYSTOLIC BLOOD PRESSURE: 101 MMHG | TEMPERATURE: 98 F | OXYGEN SATURATION: 97 % | DIASTOLIC BLOOD PRESSURE: 62 MMHG | HEIGHT: 56 IN | HEART RATE: 85 BPM

## 2023-05-01 DIAGNOSIS — M79.89 LEG SWELLING: Primary | ICD-10-CM

## 2023-05-01 DIAGNOSIS — N63.20 MASS OF LEFT BREAST, UNSPECIFIED QUADRANT: ICD-10-CM

## 2023-05-01 DIAGNOSIS — R06.02 SHORTNESS OF BREATH: ICD-10-CM

## 2023-05-01 DIAGNOSIS — R91.1 LUNG NODULE SEEN ON IMAGING STUDY: ICD-10-CM

## 2023-05-01 DIAGNOSIS — D64.9 ANEMIA, UNSPECIFIED TYPE: ICD-10-CM

## 2023-05-01 DIAGNOSIS — R53.83 FATIGUE, UNSPECIFIED TYPE: ICD-10-CM

## 2023-05-01 LAB
ALBUMIN SERPL BCP-MCNC: 3.5 G/DL (ref 3.5–5.2)
ALP SERPL-CCNC: 67 U/L (ref 55–135)
ALT SERPL W/O P-5'-P-CCNC: 21 U/L (ref 10–44)
ANION GAP SERPL CALC-SCNC: 5 MMOL/L (ref 8–16)
AST SERPL-CCNC: 20 U/L (ref 10–40)
BASOPHILS # BLD AUTO: 0.04 K/UL (ref 0–0.2)
BASOPHILS NFR BLD: 0.5 % (ref 0–1.9)
BILIRUB SERPL-MCNC: 0.2 MG/DL (ref 0.1–1)
BNP SERPL-MCNC: 47 PG/ML (ref 0–99)
BUN SERPL-MCNC: 11 MG/DL (ref 6–20)
CALCIUM SERPL-MCNC: 9.4 MG/DL (ref 8.7–10.5)
CHLORIDE SERPL-SCNC: 105 MMOL/L (ref 95–110)
CO2 SERPL-SCNC: 28 MMOL/L (ref 23–29)
CREAT SERPL-MCNC: 0.7 MG/DL (ref 0.5–1.4)
DIFFERENTIAL METHOD: ABNORMAL
EOSINOPHIL # BLD AUTO: 0.2 K/UL (ref 0–0.5)
EOSINOPHIL NFR BLD: 2.3 % (ref 0–8)
ERYTHROCYTE [DISTWIDTH] IN BLOOD BY AUTOMATED COUNT: 14.6 % (ref 11.5–14.5)
EST. GFR  (NO RACE VARIABLE): >60 ML/MIN/1.73 M^2
GLUCOSE SERPL-MCNC: 82 MG/DL (ref 70–110)
HCT VFR BLD AUTO: 32.3 % (ref 37–48.5)
HGB BLD-MCNC: 10.8 G/DL (ref 12–16)
IMM GRANULOCYTES # BLD AUTO: 0.02 K/UL (ref 0–0.04)
IMM GRANULOCYTES NFR BLD AUTO: 0.3 % (ref 0–0.5)
LYMPHOCYTES # BLD AUTO: 2.9 K/UL (ref 1–4.8)
LYMPHOCYTES NFR BLD: 35.7 % (ref 18–48)
MCH RBC QN AUTO: 32.2 PG (ref 27–31)
MCHC RBC AUTO-ENTMCNC: 33.4 G/DL (ref 32–36)
MCV RBC AUTO: 96 FL (ref 82–98)
MONOCYTES # BLD AUTO: 0.7 K/UL (ref 0.3–1)
MONOCYTES NFR BLD: 9 % (ref 4–15)
NEUTROPHILS # BLD AUTO: 4.2 K/UL (ref 1.8–7.7)
NEUTROPHILS NFR BLD: 52.2 % (ref 38–73)
NRBC BLD-RTO: 0 /100 WBC
PLATELET # BLD AUTO: 540 K/UL (ref 150–450)
PMV BLD AUTO: 9.3 FL (ref 9.2–12.9)
POTASSIUM SERPL-SCNC: 4.3 MMOL/L (ref 3.5–5.1)
PROT SERPL-MCNC: 6.8 G/DL (ref 6–8.4)
RBC # BLD AUTO: 3.35 M/UL (ref 4–5.4)
SODIUM SERPL-SCNC: 138 MMOL/L (ref 136–145)
TROPONIN I SERPL DL<=0.01 NG/ML-MCNC: <0.006 NG/ML (ref 0–0.03)
WBC # BLD AUTO: 7.98 K/UL (ref 3.9–12.7)

## 2023-05-01 PROCEDURE — 83880 ASSAY OF NATRIURETIC PEPTIDE: CPT | Performed by: NURSE PRACTITIONER

## 2023-05-01 PROCEDURE — 93010 EKG 12-LEAD: ICD-10-PCS | Mod: ,,, | Performed by: INTERNAL MEDICINE

## 2023-05-01 PROCEDURE — 25500020 PHARM REV CODE 255: Performed by: EMERGENCY MEDICINE

## 2023-05-01 PROCEDURE — 93005 ELECTROCARDIOGRAM TRACING: CPT

## 2023-05-01 PROCEDURE — 99285 EMERGENCY DEPT VISIT HI MDM: CPT | Mod: 25

## 2023-05-01 PROCEDURE — 80053 COMPREHEN METABOLIC PANEL: CPT | Performed by: NURSE PRACTITIONER

## 2023-05-01 PROCEDURE — 84484 ASSAY OF TROPONIN QUANT: CPT | Performed by: NURSE PRACTITIONER

## 2023-05-01 PROCEDURE — 85025 COMPLETE CBC W/AUTO DIFF WBC: CPT | Performed by: NURSE PRACTITIONER

## 2023-05-01 PROCEDURE — 93010 ELECTROCARDIOGRAM REPORT: CPT | Mod: ,,, | Performed by: INTERNAL MEDICINE

## 2023-05-01 RX ADMIN — IOHEXOL 75 ML: 350 INJECTION, SOLUTION INTRAVENOUS at 04:05

## 2023-05-01 NOTE — DISCHARGE INSTRUCTIONS
You were seen in the ER for swelling to your bilateral lower legs.  I recommend that you elevate your legs and wear compression stocking to help with the fluid buildup. You also were diagnosed with a mass of your left breast, you need to follow up and receive a mammogram. Follow up with your PCP in 2 days for re-evaluation. Return to the ER sooner if symptoms worsen.

## 2023-05-01 NOTE — FIRST PROVIDER EVALUATION
Emergency Department TeleTriage Encounter Note      CHIEF COMPLAINT    Chief Complaint   Patient presents with    Leg Swelling     BLE swelling with pain x 3 days, denies hx of CHF; pt also reporting pain to L breast from getting hit by door 4 months ago; pt sent from [facility] for evaluation       VITAL SIGNS   Initial Vitals [05/01/23 1147]   BP Pulse Resp Temp SpO2   108/73 81 16 98 °F (36.7 °C) 99 %      MAP       --            ALLERGIES    Review of patient's allergies indicates:  No Known Allergies    PROVIDER TRIAGE NOTE  This is a teletriage evaluation of a 57 y.o. female presenting to the ED complaining of BLE edema for the past three days. Denies SOB and CP. Denies pmhx of CHF.  Reports sore throat.     Pt is speaking in complete sentences. No resp distress.  With repot of BLE edema, will evaluate for CHF.     Initial orders will be placed and care will be transferred to an alternate provider when patient is roomed for a full evaluation. Any additional orders and the final disposition will be determined by that provider.         ORDERS  Labs Reviewed - No data to display    ED Orders (720h ago, onward)      Start Ordered     Status Ordering Provider    05/01/23 1222 05/01/23 1222  Confirm Patient is not Eligible for Congestive Heart Failure Pathway  Until discontinued         Ordered ARNALDO DYKES N.    05/01/23 1222 05/01/23 1222  Vital signs  Every 30 min         Ordered ARNALDO DYKES N.    05/01/23 1222 05/01/23 1222  Cardiac Monitoring - Adult  Continuous        Comments: Notify Physician If:    Ordered ARNALDO DYKES N.    05/01/23 1222 05/01/23 1222  Pulse Oximetry Continuous  Continuous         Ordered ARNALDO DYKES N.    05/01/23 1222 05/01/23 1222  Insert peripheral IV  Once         Ordered ARNALDO DYKES N.    05/01/23 1222 05/01/23 1222  CBC auto differential  STAT         Ordered ARNALDO DYKES N.    05/01/23 1222 05/01/23 1222  Comprehensive  metabolic panel  STAT         Ordered ARNALDO DYKES N.    05/01/23 1222 05/01/23 1222  Troponin I  STAT         Ordered ARNALDO DYKES N.    05/01/23 1222 05/01/23 1222  Brain natriuretic peptide  STAT         Ordered ARNALDO DYKES N.    05/01/23 1222 05/01/23 1222  EKG 12-lead  Once         Ordered ANDREZMELECIOARNALDO N.    05/01/23 1222 05/01/23 1222  X-Ray Chest AP Portable  1 time imaging         Ordered JANIA ARNALDO N.              Virtual Visit Note: The provider triage portion of this emergency department evaluation and documentation was performed via ZMP, a HIPAA-compliant telemedicine application, in concert with a tele-presenter in the room. A face to face patient evaluation with one of my colleagues will occur once the patient is placed in an emergency department room.      DISCLAIMER: This note was prepared with [employer] voice recognition transcription software. Garbled syntax, mangled pronouns, and other bizarre constructions may be attributed to that software system.

## 2023-05-01 NOTE — ED TRIAGE NOTES
C/o bilateral leg swelling x3 days. Denies hx of chf. No chest pain or sob. Trace bilateral pedal edema noted. Cms intact distally.

## 2023-05-01 NOTE — ED PROVIDER NOTES
"Source of History:  Patient    Chief complaint:  Leg Swelling (BLE swelling with pain x 3 days, denies hx of CHF; pt also reporting pain to L breast from getting hit by door 4 months ago; pt sent from [facility] for evaluation)      HPI:  Cristal Garcia is a 57 y.o. female presenting with swollen with bilateral lower extremity present x3 days.  She states that she is been on her feet doing work around [employer] a lot lately and she has noticed swelling to her bilateral feet.  Reports associated pain in her feet when she walks.  She also requests evaluation of her left breast.  She states she hit her left breast on a door jam 4 months ago and it has been painful ever since.  She also notes some associated fatigue that she is had chronically but has been worsening over the past few weeks.  She denies any chest pain, shortness of breath, abdominal pain, diarrhea.  Does report some constipation.  Reports occasional minimal spotting of blood on toilet paper when she wipes once every few weeks, believes this is due to her wiping too hard.     This is the extent to the patients complaints today here in the emergency department.    PMH:  As per HPI and below:  Past Medical History:   Diagnosis Date    Bipolar disorder, unspecified     Depression      History reviewed. No pertinent surgical history.    Social History     Tobacco Use    Smoking status: Some Days     Types: Cigarettes    Smokeless tobacco: Never    Tobacco comments:     Patient stated that she smokes 1 pack a week.    Substance Use Topics    Alcohol use: Yes     Comment: Patient stated that she drinks alot    Drug use: Yes     Types: Marijuana       Review of patient's allergies indicates:  No Known Allergies    ROS: As per HPI     Physical Exam:    /62 (BP Location: Right arm, Patient Position: Sitting)   Pulse 85   Temp 97.9 °F (36.6 °C) (Oral)   Resp 17   Ht 4' 8" (1.422 m)   Wt 56.7 kg (125 lb)   LMP  (LMP Unknown)   SpO2 97%   BMI 28.02 kg/m² " "  Vitals:    05/01/23 1147 05/01/23 1502 05/01/23 1641   BP: 108/73 128/67 101/62   Pulse: 81 84 85   Resp: 16 18 17   Temp: 98 °F (36.7 °C) 97.8 °F (36.6 °C) 97.9 °F (36.6 °C)   TempSrc: Oral Oral Oral   SpO2: 99% 100% 97%   Weight: 56.7 kg (125 lb)     Height: 4' 8" (1.422 m)         Nursing note and vital signs reviewed.  Constitutional: No acute distress.  Well-appearing, non-toxic.  Eyes: No conjunctival injection.  Extraocular muscles are intact.  ENT: Oropharynx clear.   Cardiovascular:  Regular rate and rhythm no murmurs gallops or rubs. Bilateral trace pitting edema to lower legs. Overlying skin with tightening and shiny in appearance. No erythema or masses palpated. 2+ DP pulses bilaterally, neurovascularly intact.  Chest/ Respiratory:  Clear to auscultation bilaterally without wheezing rhonchi or rales.    Breast: Mass noted to upper medial aspect of left breast, firm, no fluctuance, no overlying erythema or tenderness. Left nipple noted to be inverted.  Abdomen: Soft.  Mildly distended. Nontender.  No guarding.  No rebound. Non-peritoneal.  Rectal exam performed, no hemorrhoids palpated, sphincter tone intact, Hemoccult negative.  Musculoskeletal: Good range of motion all joints.  No deformities.  Neck supple.  No meningismus.  Skin:  No abrasions.  No ecchymoses.  Neuro: alert and oriented x3,  no focal neurological deficits.  Psych: Appropriate, conversant    Initial MDM:  57-year-old female with a past medical history of depression and bipolar disorder, alcohol and drug use presenting from [facility] for evaluation of bilateral lower extremity swelling.  Labs ordered by tele triage reassuring, CMP within normal limits, troponin and BNP negative.  CBC notable for an anemia with hemoglobin of 10.8, a three point drop from value 3 weeks ago.    Labs that have been ordered have been independently reviewed and interpreted by myself.    Labs Reviewed   CBC W/ AUTO DIFFERENTIAL - Abnormal; Notable for the " following components:       Result Value    RBC 3.35 (*)     Hemoglobin 10.8 (*)     Hematocrit 32.3 (*)     MCH 32.2 (*)     RDW 14.6 (*)     Platelets 540 (*)     All other components within normal limits   COMPREHENSIVE METABOLIC PANEL - Abnormal; Notable for the following components:    Anion Gap 5 (*)     All other components within normal limits   TROPONIN I   B-TYPE NATRIURETIC PEPTIDE       CT Chest Abdomen Pelvis With Contrast (xpd)   Final Result      No acute abnormality identified.      Calcified right upper lobe granuloma, benign.  Nodular band-like opacity right lower lobe is nonspecific and could represent subsegmental atelectasis.  Findings can be followed with chest radiograph in 6 weeks to ensure stability or resolution.      No acute process seen in the abdomen or pelvis.         Electronically signed by: Dianna Kirkland   Date:    05/01/2023   Time:    16:36      US Lower Extremity Veins Bilateral   Final Result      No evidence of acute deep venous thrombosis in either lower extremity.         Electronically signed by: Dianna Kirkland   Date:    05/01/2023   Time:    15:47      X-Ray Chest AP Portable   Final Result      Two right lung nodules, possibly calcified, although this is difficult to say with confidence.  A chest CT on a nonemergent basis is recommended for further evaluation.         Electronically signed by: Shannan Rivero   Date:    05/01/2023   Time:    13:11          Results for orders placed or performed during the hospital encounter of 05/01/23   EKG 12-lead    Collection Time: 05/01/23 12:32 PM    Narrative    Test Reason : R06.02,    Vent. Rate : 074 BPM     Atrial Rate : 074 BPM     P-R Int : 204 ms          QRS Dur : 070 ms      QT Int : 388 ms       P-R-T Axes : 054 025 018 degrees     QTc Int : 430 ms    Normal sinus rhythm  Normal ECG    Confirmed by Temo Craig MD ([id number]) on 5/1/2023 3:04:02 PM    Referred By: [email or web address]   SELF           Confirmed By:Temo Craig MD  "      Differential Diagnosis:  Differential Diagnosis includes, but is not limited to:  ACS/MI, CHF, venous insufficiency, dependent lower extremity edema    MDM  57 y.o. female with past medical history of bipolar disorder, depression, alcohol and drug use presenting for evaluation of bilateral lower extremity swelling.  Legs noted to be swollen bilaterally on exam, skin shiny intake, trace pitting edema.  There are good dorsalis pedis pulses present bilaterally, bilateral feet are neurovascularly intact.  Patient also complains of a mass to her left breast, there is a firm, irregular shaped mass in the upper medial quadrant of her left breast with nipple retraction noted.  Upon chart review, she had an ultrasound done of this mass in December of 2022 and the findings were concerning for malignancy, recommended follow up with a mammogram and Oncology.  Based on chart review, I do not believe patient ever received a mammogram, seems that there is an order in the system, we will educate patient on need to follow up with this.    Given concern of malignancy, I will obtain ultrasound of the bilateral lower extremities to evaluate for DVT.  Believe anemia may be related to this possible malignancy.  Given patient reports of possible rectal bleeding in the past along with constipation and concern for malignancy the breast, we will also obtain CT chest abdomen and pelvis to evaluate for any further signs of malignancy.     CT chest abdomen and pelvis negative for any acute process, does note a nodular bandlike opacity in the right lower lobe, nonspecific and could represent subsegmental atelectasis."    Given negative workup, believe patient is stable to follow up outpatient. Recommend mammogram and follow up with PCP and oncology if indicated by mammogram. Patient with vital signs stable for discharge. Recommended compression stockings and elevation of legs for swelling. Return precautions given. Patient verbalized " understanding.          Diagnostic Impression:    1. Leg swelling    2. Shortness of breath    3. Lung nodule seen on imaging study    4. Mass of left breast, unspecified quadrant    5. Anemia, unspecified type    6. Fatigue, unspecified type         ED Disposition Condition    Discharge Stable            ED Prescriptions    None       Follow-up Information       Follow up With Specialties Details Why Contact Info    Tex Calabrese MD Internal Medicine Schedule an appointment as soon as possible for a visit in 2 days  [address]  [address]  [place] [address]  [phone]      Your mammogram appointment        [facility] Emergency Dept Emergency Medicine Go to  If symptoms worsen [address] [id number] [phone]             Lorin Poe PA-C  05/02/23 6968

## 2023-06-19 ENCOUNTER — HOSPITAL ENCOUNTER (EMERGENCY)
Facility: HOSPITAL | Age: 58
Discharge: HOME | End: 2023-06-20
Attending: EMERGENCY MEDICINE
Payer: MEDICAID

## 2023-06-19 DIAGNOSIS — F10.929 ALCOHOLIC INTOXICATION WITH COMPLICATION: Primary | ICD-10-CM

## 2023-06-19 PROCEDURE — 82077 ASSAY SPEC XCP UR&BREATH IA: CPT | Performed by: STUDENT IN AN ORGANIZED HEALTH CARE EDUCATION/TRAINING PROGRAM

## 2023-06-19 PROCEDURE — 99284 PR EMERGENCY DEPT VISIT,LEVEL IV: ICD-10-PCS | Mod: ,,, | Performed by: EMERGENCY MEDICINE

## 2023-06-19 PROCEDURE — 85025 COMPLETE CBC W/AUTO DIFF WBC: CPT | Performed by: STUDENT IN AN ORGANIZED HEALTH CARE EDUCATION/TRAINING PROGRAM

## 2023-06-19 PROCEDURE — 99284 EMERGENCY DEPT VISIT MOD MDM: CPT | Mod: ,,, | Performed by: EMERGENCY MEDICINE

## 2023-06-19 PROCEDURE — 99283 EMERGENCY DEPT VISIT LOW MDM: CPT

## 2023-06-19 PROCEDURE — 84443 ASSAY THYROID STIM HORMONE: CPT | Performed by: STUDENT IN AN ORGANIZED HEALTH CARE EDUCATION/TRAINING PROGRAM

## 2023-06-19 PROCEDURE — 80053 COMPREHEN METABOLIC PANEL: CPT | Performed by: STUDENT IN AN ORGANIZED HEALTH CARE EDUCATION/TRAINING PROGRAM

## 2023-06-19 PROCEDURE — 80143 DRUG ASSAY ACETAMINOPHEN: CPT | Performed by: STUDENT IN AN ORGANIZED HEALTH CARE EDUCATION/TRAINING PROGRAM

## 2023-06-20 VITALS
WEIGHT: 120 LBS | HEART RATE: 83 BPM | OXYGEN SATURATION: 97 % | TEMPERATURE: 98 F | DIASTOLIC BLOOD PRESSURE: 68 MMHG | RESPIRATION RATE: 16 BRPM | SYSTOLIC BLOOD PRESSURE: 107 MMHG | BODY MASS INDEX: 26.9 KG/M2

## 2023-06-20 LAB
ALBUMIN SERPL BCP-MCNC: 4 G/DL (ref 3.5–5.2)
ALP SERPL-CCNC: 87 U/L (ref 55–135)
ALT SERPL W/O P-5'-P-CCNC: 26 U/L (ref 10–44)
AMPHET+METHAMPHET UR QL: NEGATIVE
ANION GAP SERPL CALC-SCNC: 11 MMOL/L (ref 8–16)
APAP SERPL-MCNC: <3 UG/ML (ref 10–20)
AST SERPL-CCNC: 26 U/L (ref 10–40)
BACTERIA #/AREA URNS AUTO: ABNORMAL /HPF
BARBITURATES UR QL SCN>200 NG/ML: NEGATIVE
BASOPHILS # BLD AUTO: 0.06 K/UL (ref 0–0.2)
BASOPHILS NFR BLD: 0.7 % (ref 0–1.9)
BENZODIAZ UR QL SCN>200 NG/ML: NEGATIVE
BILIRUB SERPL-MCNC: 0.2 MG/DL (ref 0.1–1)
BILIRUB UR QL STRIP: NEGATIVE
BUN SERPL-MCNC: 13 MG/DL (ref 6–20)
BZE UR QL SCN: NEGATIVE
CALCIUM SERPL-MCNC: 8.8 MG/DL (ref 8.7–10.5)
CANNABINOIDS UR QL SCN: NEGATIVE
CHLORIDE SERPL-SCNC: 108 MMOL/L (ref 95–110)
CLARITY UR REFRACT.AUTO: CLEAR
CO2 SERPL-SCNC: 22 MMOL/L (ref 23–29)
COLOR UR AUTO: YELLOW
CREAT SERPL-MCNC: 0.6 MG/DL (ref 0.5–1.4)
CREAT UR-MCNC: 70 MG/DL (ref 15–325)
DIFFERENTIAL METHOD: ABNORMAL
EOSINOPHIL # BLD AUTO: 0.2 K/UL (ref 0–0.5)
EOSINOPHIL NFR BLD: 1.7 % (ref 0–8)
ERYTHROCYTE [DISTWIDTH] IN BLOOD BY AUTOMATED COUNT: 13.8 % (ref 11.5–14.5)
EST. GFR  (NO RACE VARIABLE): >60 ML/MIN/1.73 M^2
ETHANOL SERPL-MCNC: 288 MG/DL
GLUCOSE SERPL-MCNC: 81 MG/DL (ref 70–110)
GLUCOSE UR QL STRIP: NEGATIVE
HCT VFR BLD AUTO: 38.4 % (ref 37–48.5)
HGB BLD-MCNC: 13.5 G/DL (ref 12–16)
HGB UR QL STRIP: NEGATIVE
HYALINE CASTS UR QL AUTO: 1 /LPF
IMM GRANULOCYTES # BLD AUTO: 0.02 K/UL (ref 0–0.04)
IMM GRANULOCYTES NFR BLD AUTO: 0.2 % (ref 0–0.5)
KETONES UR QL STRIP: NEGATIVE
LEUKOCYTE ESTERASE UR QL STRIP: ABNORMAL
LYMPHOCYTES # BLD AUTO: 5.4 K/UL (ref 1–4.8)
LYMPHOCYTES NFR BLD: 63.1 % (ref 18–48)
MCH RBC QN AUTO: 32.6 PG (ref 27–31)
MCHC RBC AUTO-ENTMCNC: 35.2 G/DL (ref 32–36)
MCV RBC AUTO: 93 FL (ref 82–98)
METHADONE UR QL SCN>300 NG/ML: NEGATIVE
MICROSCOPIC COMMENT: ABNORMAL
MONOCYTES # BLD AUTO: 0.4 K/UL (ref 0.3–1)
MONOCYTES NFR BLD: 5 % (ref 4–15)
NEUTROPHILS # BLD AUTO: 2.5 K/UL (ref 1.8–7.7)
NEUTROPHILS NFR BLD: 29.3 % (ref 38–73)
NITRITE UR QL STRIP: NEGATIVE
NRBC BLD-RTO: 0 /100 WBC
OPIATES UR QL SCN: NEGATIVE
PCP UR QL SCN>25 NG/ML: NEGATIVE
PH UR STRIP: 5 [PH] (ref 5–8)
PLATELET # BLD AUTO: 483 K/UL (ref 150–450)
PLATELET BLD QL SMEAR: ABNORMAL
PMV BLD AUTO: 9.2 FL (ref 9.2–12.9)
POTASSIUM SERPL-SCNC: 3.9 MMOL/L (ref 3.5–5.1)
PROT SERPL-MCNC: 7.7 G/DL (ref 6–8.4)
PROT UR QL STRIP: NEGATIVE
RBC # BLD AUTO: 4.14 M/UL (ref 4–5.4)
RBC #/AREA URNS AUTO: 2 /HPF (ref 0–4)
SODIUM SERPL-SCNC: 141 MMOL/L (ref 136–145)
SP GR UR STRIP: 1.01 (ref 1–1.03)
SQUAMOUS #/AREA URNS AUTO: 4 /HPF
TOXICOLOGY INFORMATION: NORMAL
TSH SERPL DL<=0.005 MIU/L-ACNC: 0.94 UIU/ML (ref 0.4–4)
URN SPEC COLLECT METH UR: ABNORMAL
WBC # BLD AUTO: 8.59 K/UL (ref 3.9–12.7)
WBC #/AREA URNS AUTO: 4 /HPF (ref 0–5)

## 2023-06-20 PROCEDURE — 25000003 PHARM REV CODE 250: Performed by: STUDENT IN AN ORGANIZED HEALTH CARE EDUCATION/TRAINING PROGRAM

## 2023-06-20 PROCEDURE — 80307 DRUG TEST PRSMV CHEM ANLYZR: CPT | Performed by: STUDENT IN AN ORGANIZED HEALTH CARE EDUCATION/TRAINING PROGRAM

## 2023-06-20 PROCEDURE — 81001 URINALYSIS AUTO W/SCOPE: CPT | Performed by: STUDENT IN AN ORGANIZED HEALTH CARE EDUCATION/TRAINING PROGRAM

## 2023-06-20 RX ORDER — ACETAMINOPHEN 500 MG
1000 TABLET ORAL
Status: DISCONTINUED | OUTPATIENT
Start: 2023-06-20 | End: 2023-06-20 | Stop reason: HOSPADM

## 2023-06-20 RX ORDER — IBUPROFEN 600 MG/1
600 TABLET ORAL
Status: COMPLETED | OUTPATIENT
Start: 2023-06-20 | End: 2023-06-20

## 2023-06-20 RX ADMIN — IBUPROFEN 600 MG: 600 TABLET, FILM COATED ORAL at 12:06

## 2023-06-20 NOTE — DISCHARGE INSTRUCTIONS
Diagnosis: alcohol intoxication    Tests today showed:   Labs Reviewed   CBC W/ AUTO DIFFERENTIAL - Abnormal; Notable for the following components:       Result Value    MCH 32.6 (*)     Platelets 483 (*)     Lymph # 5.4 (*)     Gran % 29.3 (*)     Lymph % 63.1 (*)     Platelet Estimate Increased (*)     All other components within normal limits   COMPREHENSIVE METABOLIC PANEL - Abnormal; Notable for the following components:    CO2 22 (*)     All other components within normal limits   URINALYSIS, REFLEX TO URINE CULTURE - Abnormal; Notable for the following components:    Leukocytes, UA 1+ (*)     All other components within normal limits    Narrative:     Specimen Source->Urine   ALCOHOL,MEDICAL (ETHANOL) - Abnormal; Notable for the following components:    Alcohol, Serum 288 (*)     All other components within normal limits   ACETAMINOPHEN LEVEL - Abnormal; Notable for the following components:    Acetaminophen (Tylenol), Serum <3.0 (*)     All other components within normal limits   URINALYSIS MICROSCOPIC - Abnormal; Notable for the following components:    Bacteria Few (*)     All other components within normal limits    Narrative:     Specimen Source->Urine   TSH   DRUG SCREEN PANEL, URINE EMERGENCY    Narrative:     Specimen Source->Urine     No orders to display       Treatments you had today:   Medications   acetaminophen tablet 1,000 mg (1,000 mg Oral Not Given 6/20/23 0045)   ibuprofen tablet 600 mg (600 mg Oral Given 6/20/23 0040)       Follow-Up Plan:  - I have provided you with a list of alcohol treatment resources.   - Please follow-up with your psychiatrist as soon as possible   - Please return to the ED if you have any new or worsening symptoms or thoughts of hurting yourself or others.   - Follow-up with primary care doctor within 3 - 5 days  - Additional testing and/or evaluation as directed by your primary doctor    Return to the Emergency Department for symptoms including but not limited to:  worsening symptoms, shortness of breath or chest pain, vomiting with inability to hold down fluids, fevers greater than 100.4°F, passing out/fainting/unconsciousness, or other concerning symptoms.

## 2023-06-20 NOTE — ED NOTES
"Patient has pulled her (L) forearm IV out & states, " you can put another one in if you need to. I just wanted it out." She remains pleasant & cooperative. DVC is maintained for safety.    "

## 2023-06-20 NOTE — PROVIDER PROGRESS NOTES - EMERGENCY DEPT.
Encounter Date: 6/19/2023    ED Physician Progress Notes        Physician Note:   ED Resident HAND-OFF NOTE:  6:08 AM 6/20/2023  Cristal Garcia is a 57 y.o. female who presented to the ED on 6/20/2023 and has been managed by Dr. Marshall and Dr. Tang, who reports patient was brought in for making statements about wanting to die when she was intoxicated last night. I assumed care of patient from off-going ED physician team at 6:08 AM pending clinical sobriety.    On my evaluation, Cristal Garcia appears well, hemodynamically stable and in NAD. Thus far, Cristal Garcia has received:  Medications  acetaminophen tablet 1,000 mg (1,000 mg Oral Not Given 6/20/23 0045)  ibuprofen tablet 600 mg (600 mg Oral Given 6/20/23 0040)    On my exam, I appreciate:  /66 (BP Location: Left arm, Patient Position: Lying)   Pulse 86   Temp 97.9 °F (36.6 °C) (Oral)   Resp 18   LMP  (LMP Unknown)   SpO2 95%     I have discussed and counseled Cristal Garcia regarding exam, results, diagnosis, treatment, and plan.  ______________________  Reyna Jackson,   Emergency Medicine Resident  6:08 AM 6/20/2023      UPDATE: I had a discussion with the patient and she is now clinical sober, able to ambulate without difficulty, denies SI or HI, denies AH or VH. States she was making statements last night because she was intoxicated and she does not really feel that way. She states she feels safe going home with her father and will follow up with her psychiatrist and PCP as soon as possible. I provided a list of alcohol abuse treatment resources as well as mental health resources. I encouraged the patient to return to the ED if she began having any thoughts of self harm or of harming others. I additionally discussed plan for DC and strict return precations. Patient expressed understanding and agreement with all of the above. Patient's father came to pick her up from the ED.       Final diagnoses:  [F10.929] Alcoholic intoxication with complication  (Primary)

## 2023-06-20 NOTE — ED NOTES
The patient is escorted to room # 26 by the EDT, [employer] & hospital security officers. She is attired in a sundress, no shoes. She is loud w/ an unsteady gait. She appears inebriated. She jokingly asks staff for Vodka or Patron to drink. She presents in handcuffs which is released upon patient's arrival to the room. She is using profanities in a jovial manner. She is oriented in all spheres. She states that she asked her father to call for EMS to bring her to the hospital. She denies S/HI, A/VH. She endorses depression. She is maintained on DVC for safety.

## 2023-06-20 NOTE — ED NOTES
Assumed pt care, she is sitting up on the stretcher resting. Pt no longer desires inpatient ETOH rehab. Pt states she just wants to go home. MD notified by previous shift, pt's father os coming to pick her up around 08:00.

## 2023-06-20 NOTE — ED NOTES
Patient states that her father will be able to pick her up from the ED. Contacted patient's father, Mr. Grimm @ [phone] & states will pick patient up @ about 0800.

## 2023-06-20 NOTE — ED NOTES
Dr. Roberto is @ bedside providing the POC. PEC is rescinded. The patient is void of any complaints.

## 2023-06-20 NOTE — ED PROVIDER NOTES
"Encounter Date: 6/19/2023       History     Chief Complaint   Patient presents with    Psychiatric Evaluation     Patient brought in by police. Police reports tat patients father called police with concern for patient that patient was threatening to harm herself and harm other. When asked if patient has any feelings to harm herslef or to harm other she answers yes, but will not elaborate on a plan. Patient reports that she has been drinking tonight and states that she has had some wine, stating that she had "a little bit."     Patient is a 57 year old female with a past medical history of alcohol use disorder, depression and bipolar disorder presenting to the ED by the police for a psychiatric evaluation.  Per report, patient had made comments about wanting to harm herself to her father, whom she lives with. She admits to drinking a bottle of wine earlier today. She denies wanting to hurt herself currently and states she does not remember saying this and she is unsure why her father called the police. Denies AVH or HI.      Review of patient's allergies indicates:  No Known Allergies  Past Medical History:   Diagnosis Date    Bipolar disorder, unspecified     Depression      No past surgical history on file.  Family History   Problem Relation Age of Onset    No Known Problems Mother     No Known Problems Father     No Known Problems Sister     No Known Problems Brother     No Known Problems Maternal Aunt     No Known Problems Maternal Uncle     No Known Problems Paternal Aunt     No Known Problems Paternal Uncle     No Known Problems Maternal Grandmother     No Known Problems Maternal Grandfather     No Known Problems Paternal Grandmother     No Known Problems Paternal Grandfather      Social History     Tobacco Use    Smoking status: Some Days     Types: Cigarettes    Smokeless tobacco: Never    Tobacco comments:     Patient stated that she smokes 1 pack a week.    Substance Use Topics    Alcohol use: Yes     " Comment: Patient stated that she drinks alot    Drug use: Yes     Types: Marijuana       Physical Exam     Initial Vitals [06/19/23 2227]   BP Pulse Resp Temp SpO2   135/86 98 18 98.5 °F (36.9 °C) 99 %      MAP       --         Physical Exam    Nursing note and vitals reviewed.  Constitutional: She appears well-developed and well-nourished. She is not diaphoretic. No distress.   Speaking full, but slurred sentences. No acute distress.   HENT:   Head: Normocephalic and atraumatic.   Right Ear: External ear normal.   Left Ear: External ear normal.   Neck: Neck supple.   Cardiovascular:  Normal rate, regular rhythm, normal heart sounds and intact distal pulses.           Pulmonary/Chest: Breath sounds normal. No respiratory distress. She has no wheezes. She has no rhonchi. She has no rales.   Abdominal: Abdomen is soft. She exhibits no distension. There is no abdominal tenderness. There is no rebound and no guarding.   Musculoskeletal:      Cervical back: Neck supple.     Neurological: She is alert and oriented to person, place, and time. GCS score is 15. GCS eye subscore is 4. GCS verbal subscore is 5. GCS motor subscore is 6.   Skin: Skin is warm. Capillary refill takes less than 2 seconds. No rash noted.   Psychiatric: Her affect is labile. Her speech is slurred. She expresses impulsivity.   Tearful.        ED Course   Procedures  Labs Reviewed   CBC W/ AUTO DIFFERENTIAL - Abnormal; Notable for the following components:       Result Value    MCH 32.6 (*)     Platelets 483 (*)     Lymph # 5.4 (*)     Gran % 29.3 (*)     Lymph % 63.1 (*)     Platelet Estimate Increased (*)     All other components within normal limits   COMPREHENSIVE METABOLIC PANEL - Abnormal; Notable for the following components:    CO2 22 (*)     All other components within normal limits   URINALYSIS, REFLEX TO URINE CULTURE - Abnormal; Notable for the following components:    Leukocytes, UA 1+ (*)     All other components within normal limits     "Narrative:     Specimen Source->Urine   ALCOHOL,MEDICAL (ETHANOL) - Abnormal; Notable for the following components:    Alcohol, Serum 288 (*)     All other components within normal limits   ACETAMINOPHEN LEVEL - Abnormal; Notable for the following components:    Acetaminophen (Tylenol), Serum <3.0 (*)     All other components within normal limits   URINALYSIS MICROSCOPIC - Abnormal; Notable for the following components:    Bacteria Few (*)     All other components within normal limits    Narrative:     Specimen Source->Urine   TSH   DRUG SCREEN PANEL, URINE EMERGENCY    Narrative:     Specimen Source->Urine          Imaging Results    None          Medications   acetaminophen tablet 1,000 mg (1,000 mg Oral Not Given 6/20/23 0045)   ibuprofen tablet 600 mg (600 mg Oral Given 6/20/23 0040)     Medical Decision Making:   History:   I obtained history from: someone other than patient.  Old Medical Records: I decided to obtain old medical records.  Initial Assessment:   Emergent evaluation for psychiatric complaints. She is afebrile and hemodynamically stable.  Differential Diagnosis:   MDD with suicidal ideations, substance induced mood disorder, substance induced psychosis  Clinical Tests:   Lab Tests: Ordered and Reviewed  ED Management:  Collateral information was obtained by patient's father. He reports that patient typically makes passive suicidal statements when she is intoxicated, and never when she is sober. After some time and reassessment, patient more sober and reiterates that she is not suicidal and states "I have a problem with alcohol". Wants to go to [facility] for treatment. PEC rescinded as pt does not meet criteria. The patient was signed out to the oncoming team at shift change pending clinical sobriety.          Attending Attestation:   Physician Attestation Statement for Resident:  As the supervising MD   Physician Attestation Statement: I have personally seen and examined this patient.   I agree " with the above history.  -: Patient intoxicated and some actively denied suicidal thoughts.  Upon resident contacting patient's collateral, he noted patient makes suicidal threats when drunk but never when sober.  Patient was observed in the ED. once her ethanol dropped below an estimated 200, she remains denying any suicidal thoughts.  She was offered to speak with a psychiatrist but declined.  Pec was rescinded.   As the supervising MD I agree with the above PE.     As the supervising MD I agree with the above treatment, course, plan, and disposition.                               Clinical Impression:   Final diagnoses:  [F10.929] Alcoholic intoxication with complication (Primary)               Diaz Roberto MD  Resident  06/20/23 3033

## 2023-06-20 NOTE — ED NOTES
Pt given printed AVS, she song no questions at this time. Pt escorted off of the unit with her father.

## 2023-06-20 NOTE — ED NOTES
"The patient desires to transfer to a treatment facility, Dorys Jackson & Felisa both notified of patient's desire.  will be notified by the provider. The patient ambulates w/ a steady gait. When asked if she recalls the events surrounding her ED presentation she states, " I'm a alcoholic & I need help." She states that she was @ [facility] & left one week earlier than she was suppose to. She is provided 4 servings of juice. NAD.  "

## 2024-05-03 ENCOUNTER — HOSPITAL ENCOUNTER (EMERGENCY)
Facility: HOSPITAL | Age: 59
Discharge: HOME | End: 2024-05-03
Attending: EMERGENCY MEDICINE
Payer: MEDICAID

## 2024-05-03 VITALS
OXYGEN SATURATION: 100 % | RESPIRATION RATE: 20 BRPM | HEART RATE: 113 BPM | TEMPERATURE: 99 F | DIASTOLIC BLOOD PRESSURE: 78 MMHG | SYSTOLIC BLOOD PRESSURE: 112 MMHG

## 2024-05-03 DIAGNOSIS — F32.A DEPRESSION, UNSPECIFIED DEPRESSION TYPE: Primary | ICD-10-CM

## 2024-05-03 DIAGNOSIS — R00.0 TACHYCARDIA: ICD-10-CM

## 2024-05-03 LAB
ALBUMIN SERPL BCP-MCNC: 4.1 G/DL (ref 3.5–5.2)
ALP SERPL-CCNC: 96 U/L (ref 55–135)
ALT SERPL W/O P-5'-P-CCNC: 35 U/L (ref 10–44)
AMPHET+METHAMPHET UR QL: NEGATIVE
ANION GAP SERPL CALC-SCNC: 9 MMOL/L (ref 8–16)
APAP SERPL-MCNC: <3 UG/ML (ref 10–20)
AST SERPL-CCNC: 29 U/L (ref 10–40)
BARBITURATES UR QL SCN>200 NG/ML: NEGATIVE
BASOPHILS # BLD AUTO: 0.05 K/UL (ref 0–0.2)
BASOPHILS NFR BLD: 0.7 % (ref 0–1.9)
BENZODIAZ UR QL SCN>200 NG/ML: NEGATIVE
BILIRUB SERPL-MCNC: 0.2 MG/DL (ref 0.1–1)
BILIRUB UR QL STRIP: NEGATIVE
BUN SERPL-MCNC: 14 MG/DL (ref 6–20)
BZE UR QL SCN: NEGATIVE
CALCIUM SERPL-MCNC: 10 MG/DL (ref 8.7–10.5)
CANNABINOIDS UR QL SCN: NEGATIVE
CHLORIDE SERPL-SCNC: 109 MMOL/L (ref 95–110)
CLARITY UR REFRACT.AUTO: CLEAR
CO2 SERPL-SCNC: 23 MMOL/L (ref 23–29)
COLOR UR AUTO: NORMAL
CREAT SERPL-MCNC: 0.7 MG/DL (ref 0.5–1.4)
CREAT UR-MCNC: 6 MG/DL (ref 15–325)
D DIMER PPP IA.FEU-MCNC: <0.19 MG/L FEU
DIFFERENTIAL METHOD BLD: ABNORMAL
EOSINOPHIL # BLD AUTO: 0.1 K/UL (ref 0–0.5)
EOSINOPHIL NFR BLD: 1.6 % (ref 0–8)
ERYTHROCYTE [DISTWIDTH] IN BLOOD BY AUTOMATED COUNT: 13.5 % (ref 11.5–14.5)
EST. GFR  (NO RACE VARIABLE): >60 ML/MIN/1.73 M^2
ETHANOL SERPL-MCNC: 188 MG/DL
GLUCOSE SERPL-MCNC: 90 MG/DL (ref 70–110)
GLUCOSE UR QL STRIP: NEGATIVE
HCT VFR BLD AUTO: 47.4 % (ref 37–48.5)
HCV AB SERPL QL IA: NORMAL
HGB BLD-MCNC: 15.9 G/DL (ref 12–16)
HGB UR QL STRIP: NEGATIVE
HIV 1+2 AB+HIV1 P24 AG SERPL QL IA: NORMAL
IMM GRANULOCYTES # BLD AUTO: 0.02 K/UL (ref 0–0.04)
IMM GRANULOCYTES NFR BLD AUTO: 0.3 % (ref 0–0.5)
KETONES UR QL STRIP: NEGATIVE
LEUKOCYTE ESTERASE UR QL STRIP: NEGATIVE
LYMPHOCYTES # BLD AUTO: 3.1 K/UL (ref 1–4.8)
LYMPHOCYTES NFR BLD: 40.9 % (ref 18–48)
MCH RBC QN AUTO: 32.2 PG (ref 27–31)
MCHC RBC AUTO-ENTMCNC: 33.5 G/DL (ref 32–36)
MCV RBC AUTO: 96 FL (ref 82–98)
METHADONE UR QL SCN>300 NG/ML: NEGATIVE
MONOCYTES # BLD AUTO: 0.4 K/UL (ref 0.3–1)
MONOCYTES NFR BLD: 5.2 % (ref 4–15)
NEUTROPHILS # BLD AUTO: 3.8 K/UL (ref 1.8–7.7)
NEUTROPHILS NFR BLD: 51.3 % (ref 38–73)
NITRITE UR QL STRIP: NEGATIVE
NRBC BLD-RTO: 0 /100 WBC
OHS QRS DURATION: 64 MS
OHS QTC CALCULATION: 478 MS
OPIATES UR QL SCN: NEGATIVE
PCP UR QL SCN>25 NG/ML: NEGATIVE
PH UR STRIP: 6 [PH] (ref 5–8)
PLATELET # BLD AUTO: 453 K/UL (ref 150–450)
PMV BLD AUTO: 9.5 FL (ref 9.2–12.9)
POTASSIUM SERPL-SCNC: 4.3 MMOL/L (ref 3.5–5.1)
PROT SERPL-MCNC: 7.8 G/DL (ref 6–8.4)
PROT UR QL STRIP: NEGATIVE
RBC # BLD AUTO: 4.94 M/UL (ref 4–5.4)
SODIUM SERPL-SCNC: 141 MMOL/L (ref 136–145)
SP GR UR STRIP: 1 (ref 1–1.03)
TOXICOLOGY INFORMATION: ABNORMAL
TSH SERPL DL<=0.005 MIU/L-ACNC: 1.29 UIU/ML (ref 0.4–4)
URN SPEC COLLECT METH UR: NORMAL
WBC # BLD AUTO: 7.48 K/UL (ref 3.9–12.7)

## 2024-05-03 PROCEDURE — 82077 ASSAY SPEC XCP UR&BREATH IA: CPT | Performed by: EMERGENCY MEDICINE

## 2024-05-03 PROCEDURE — 80307 DRUG TEST PRSMV CHEM ANLYZR: CPT | Performed by: EMERGENCY MEDICINE

## 2024-05-03 PROCEDURE — 87389 HIV-1 AG W/HIV-1&-2 AB AG IA: CPT | Performed by: PHYSICIAN ASSISTANT

## 2024-05-03 PROCEDURE — 86803 HEPATITIS C AB TEST: CPT | Performed by: PHYSICIAN ASSISTANT

## 2024-05-03 PROCEDURE — 81003 URINALYSIS AUTO W/O SCOPE: CPT | Performed by: EMERGENCY MEDICINE

## 2024-05-03 PROCEDURE — 80053 COMPREHEN METABOLIC PANEL: CPT | Performed by: EMERGENCY MEDICINE

## 2024-05-03 PROCEDURE — 99285 EMERGENCY DEPT VISIT HI MDM: CPT | Mod: 25

## 2024-05-03 PROCEDURE — 80143 DRUG ASSAY ACETAMINOPHEN: CPT | Performed by: EMERGENCY MEDICINE

## 2024-05-03 PROCEDURE — 85379 FIBRIN DEGRADATION QUANT: CPT | Performed by: EMERGENCY MEDICINE

## 2024-05-03 PROCEDURE — 93005 ELECTROCARDIOGRAM TRACING: CPT

## 2024-05-03 PROCEDURE — 93010 ELECTROCARDIOGRAM REPORT: CPT | Mod: ,,, | Performed by: INTERNAL MEDICINE

## 2024-05-03 PROCEDURE — 85025 COMPLETE CBC W/AUTO DIFF WBC: CPT | Performed by: EMERGENCY MEDICINE

## 2024-05-03 PROCEDURE — 84443 ASSAY THYROID STIM HORMONE: CPT | Performed by: EMERGENCY MEDICINE

## 2024-05-03 RX ORDER — DROPERIDOL 2.5 MG/ML
2.5 INJECTION, SOLUTION INTRAMUSCULAR; INTRAVENOUS
Status: DISCONTINUED | OUTPATIENT
Start: 2024-05-03 | End: 2024-05-03 | Stop reason: HOSPADM

## 2024-05-03 NOTE — DISCHARGE INSTRUCTIONS
Call 911 immediately if you have thoughts of suicide.    Our goal in the emergency department is to always give you outstanding care and exceptional service. You may receive a survey by mail or e-mail in the next week regarding your experience in our ED. We would greatly appreciate your completing and returning the survey. Your feedback provides us with a way to recognize our staff who give very good care and it helps us learn how to improve when your experience was below our aspiration of excellence.

## 2024-05-03 NOTE — ED PROVIDER NOTES
Encounter Date: 5/3/2024       History     Chief Complaint   Patient presents with    Psychiatric Evaluation     [place] [facility] [place], + ETOH, PD reports patient fighting with family, told officer to shoot her    Wound Check     Pt states here for left breast wound check, recently diagnosed with breast cancer     58-year-old female, history of bipolar disorder, alcohol use disorder, diagnosed with a left breast mass highly suspicious for malignancy 2 days ago at an outside facility, now brought in by EMS for acute agitation.  EMS was apparently called to the house, patient was in an argument/altercation with a family member of hers.  When they got there it seems that she told the police to shoot her.  Patient verbalizing distress over a recent diagnosis of likely breast cancer.    The history is provided by the patient and the EMS personnel. The history is limited by the absence of a caregiver and the condition of the patient.     Review of patient's allergies indicates:  No Known Allergies  Past Medical History:   Diagnosis Date    Bipolar disorder, unspecified     Depression      No past surgical history on file.  Family History   Problem Relation Name Age of Onset    No Known Problems Mother      No Known Problems Father      No Known Problems Sister      No Known Problems Brother      No Known Problems Maternal Aunt      No Known Problems Maternal Uncle      No Known Problems Paternal Aunt      No Known Problems Paternal Uncle      No Known Problems Maternal Grandmother      No Known Problems Maternal Grandfather      No Known Problems Paternal Grandmother      No Known Problems Paternal Grandfather       Social History     Tobacco Use    Smoking status: Some Days     Types: Cigarettes    Smokeless tobacco: Never    Tobacco comments:     Patient stated that she smokes 1 pack a week.    Substance Use Topics    Alcohol use: Yes     Comment: Patient stated that she drinks alot    Drug use: Yes      Types: Marijuana     Review of Systems    Physical Exam     Initial Vitals [05/03/24 1025]   BP Pulse Resp Temp SpO2   (!) 143/78 (!) 119 (!) 24 97.7 °F (36.5 °C) 95 %      MAP       --         Physical Exam    Nursing note and vitals reviewed.  Constitutional: She is uncooperative.   Neck: Neck supple.   Pulmonary/Chest: No respiratory distress. Left breast exhibits inverted nipple, mass and skin change. There is breast swelling. Breasts are asymmetrical.   Large firm mass to left upper breast    Exam performed with ED tech as chaperone   Genitourinary: There is breast swelling.   Musculoskeletal:      Cervical back: Neck supple.     Neurological: She is alert.   Psychiatric: Her affect is labile and inappropriate. Her speech is slurred. She is agitated and hyperactive. Cognition and memory are impaired. She expresses suicidal ideation.         ED Course   Procedures  Labs Reviewed   CBC W/ AUTO DIFFERENTIAL - Abnormal; Notable for the following components:       Result Value    MCH 32.2 (*)     Platelets 453 (*)     All other components within normal limits    Narrative:     Release to patient->Immediate   DRUG SCREEN PANEL, URINE EMERGENCY - Abnormal; Notable for the following components:    Creatinine, Urine 6.0 (*)     All other components within normal limits    Narrative:     Specimen Source->Urine   ALCOHOL,MEDICAL (ETHANOL) - Abnormal; Notable for the following components:    Alcohol, Serum 188 (*)     All other components within normal limits    Narrative:     Release to patient->Immediate   ACETAMINOPHEN LEVEL - Abnormal; Notable for the following components:    Acetaminophen (Tylenol), Serum <3.0 (*)     All other components within normal limits    Narrative:     Release to patient->Immediate   HIV 1 / 2 ANTIBODY    Narrative:     Release to patient->Immediate   HEPATITIS C ANTIBODY    Narrative:     Release to patient->Immediate   COMPREHENSIVE METABOLIC PANEL    Narrative:     Release to  patient->Immediate   TSH    Narrative:     Release to patient->Immediate   URINALYSIS, REFLEX TO URINE CULTURE    Narrative:     Specimen Source->Urine   D DIMER, QUANTITATIVE        ECG Results              EKG 12-lead (Final result)        Collection Time Result Time QRS Duration OHS QTC Calculation    05/03/24 13:57:30 05/03/24 14:12:39 64 478                     Final result by Interface, Lab In [facility] (05/03/24 14:12:43)                   Narrative:    Test Reason : R00.0,    Vent. Rate : 106 BPM     Atrial Rate : 106 BPM     P-R Int : 168 ms          QRS Dur : 064 ms      QT Int : 360 ms       P-R-T Axes : 069 043 049 degrees     QTc Int : 478 ms    Sinus tachycardia  Otherwise normal ECG  When compared with ECG of 01-MAY-2023 12:32,  No significant change was found  Confirmed by Hiral Mills MD ([id number]) on 5/3/2024 2:12:36 PM    Referred By: [email or web address]   SELF           Confirmed By:Hiral Mills MD                                  Imaging Results              X-Ray Chest AP Portable (Final result)  Result time 05/03/24 13:43:43      Final result by Karen Rios MD (05/03/24 13:43:43)                   Impression:      No acute intrathoracic process seen.      Electronically signed by: Karen Rios MD  Date:    05/03/2024  Time:    13:43               Narrative:    EXAMINATION:  XR CHEST AP PORTABLE    CLINICAL HISTORY:  agitation;    TECHNIQUE:  Single frontal view of the chest was performed.    COMPARISON:  05/01/2023    FINDINGS:  The cardiac silhouette is normal in size.  The pulmonary vascularity is normal.  The lungs are well inflated, clear.  No pleural effusion, no pneumothorax.  The osseous structures appear normal.                                       Medications   droPERidol injection 2.5 mg (2.5 mg Intramuscular Not Given 5/3/24 1045)     Medical Decision Making  This patient is experiencing an acute psychiatric emergency.      The ED plan will include the following  interventions:    -Labs including CBC, CMP, TSH, UA, urine drug tox screen, ethanol for medical clearance  -1:1 observation  -Medications will be needed for agitation/patient and staff safety; droperidol 2.5 mg IM  -PEC placed because patient is not safe for d/c home either because of grave disability, acutely suicidal     11:50 AM  Patient reassessed, status post initial sedation for severe agitation, patient now more calm, cooperative.  She tells me that she went for a breast biopsy this morning at [facility] and there was a scheduling error and they told her that she could not get it until Monday.  She reports she became very upset and hearing this and then drank a lot of alcohol and then the subsequent events happened.  At this point she says she does not want to die and she actually is very motivated to get her left breast mass worked up as urgently as possible.  Will keep pec for now but will reassess patient wants clinically sober and has psychiatry assess her.  Patient also noted to be mildly tachy and hypoxic, workup brought in due include EKG, chest x-ray and will add D-dimer    Amount and/or Complexity of Data Reviewed  External Data Reviewed: notes.  Labs: ordered. Decision-making details documented in ED Course.  Radiology: ordered.    Risk  Prescription drug management.  Decision regarding hospitalization.               ED Course as of 05/03/24 1511   Fri May 03, 2024   1417 Acetaminophen Level(!): <3.0 [AS]   1417 Alcohol, Serum(!): 188 [AS]   1417 D-Dimer: <0.19 [AS]      ED Course User Index  [AS] Lydia Jennings MD                           Clinical Impression:  Final diagnoses:  [R00.0] Tachycardia                 Lydia Jennings MD  05/03/24 1457       Lydia Jennings MD  05/03/24 1511

## 2024-05-03 NOTE — ED NOTES
Spoke to [facility] on the phone--waiting for transportation to home at this time--Valuables given to pt.

## 2024-05-03 NOTE — ED NOTES
Patient turned over to me by Dr. Jennings at 1500    Briefly:   58-year-old female had passive suicidal ideation in the setting of new breast mass and alcohol use.  Turned over to me pending re-evaluation while clinically sober.    1720:  I evaluated patient at bedside completed a physical exam.  No slurred speech, altered mental status, or SI.  Wants to go home so she can resume medical care for breast mass.  Does not want admission to a psychiatric hospital.  She repeatedly and emphatically denies that she has a danger to herself.  Will discharge home.  She will resume care for her breast mass on Monday.  Patient will return to ED for worsening symptoms, inability to eat/drink, fever greater than 100.4, or any other concerns. Did bedside teaching with return precautions.  All questions answered.  The patient acknowledges understanding.  Gave verbal discharge instructions.      Marcello Stephenson MD  05/03/24 1482